# Patient Record
Sex: FEMALE | Race: ASIAN | Employment: UNEMPLOYED | URBAN - METROPOLITAN AREA
[De-identification: names, ages, dates, MRNs, and addresses within clinical notes are randomized per-mention and may not be internally consistent; named-entity substitution may affect disease eponyms.]

---

## 2017-01-20 ENCOUNTER — OFFICE VISIT (OUTPATIENT)
Dept: FAMILY MEDICINE CLINIC | Age: 57
End: 2017-01-20

## 2017-01-20 VITALS
HEIGHT: 63 IN | WEIGHT: 160.4 LBS | HEART RATE: 81 BPM | TEMPERATURE: 97.9 F | BODY MASS INDEX: 28.42 KG/M2 | RESPIRATION RATE: 16 BRPM | DIASTOLIC BLOOD PRESSURE: 72 MMHG | OXYGEN SATURATION: 98 % | SYSTOLIC BLOOD PRESSURE: 129 MMHG

## 2017-01-20 DIAGNOSIS — Z12.11 COLON CANCER SCREENING: ICD-10-CM

## 2017-01-20 DIAGNOSIS — Z12.39 BREAST CANCER SCREENING: ICD-10-CM

## 2017-01-20 DIAGNOSIS — Z86.39 HISTORY OF HYPERTHYROIDISM: ICD-10-CM

## 2017-01-20 DIAGNOSIS — J01.00 ACUTE NON-RECURRENT MAXILLARY SINUSITIS: Primary | ICD-10-CM

## 2017-01-20 DIAGNOSIS — R23.2 HOT FLASHES: ICD-10-CM

## 2017-01-20 PROCEDURE — 99214 OFFICE O/P EST MOD 30 MIN: CPT | Performed by: FAMILY MEDICINE

## 2017-01-20 RX ORDER — AZITHROMYCIN 250 MG/1
250 TABLET, FILM COATED ORAL DAILY
Qty: 6 TABLET | Refills: 0 | Status: SHIPPED | OUTPATIENT
Start: 2017-01-20 | End: 2017-01-25

## 2017-01-20 ASSESSMENT — ENCOUNTER SYMPTOMS
CONSTIPATION: 0
EYE ITCHING: 0
RECTAL PAIN: 0
CHEST TIGHTNESS: 0
SINUS PRESSURE: 1
DIARRHEA: 0
EYE PAIN: 0
WHEEZING: 0
VOMITING: 0
TROUBLE SWALLOWING: 0
NAUSEA: 0
COUGH: 0
PHOTOPHOBIA: 0
SHORTNESS OF BREATH: 0
CHOKING: 0
FACIAL SWELLING: 0
ABDOMINAL PAIN: 0
SORE THROAT: 0
RHINORRHEA: 1
VOICE CHANGE: 0
APNEA: 0
STRIDOR: 0
EYE DISCHARGE: 0
EYE REDNESS: 0
ANAL BLEEDING: 0
ABDOMINAL DISTENTION: 0
BLOOD IN STOOL: 0

## 2017-01-23 DIAGNOSIS — Z13.0 SCREENING FOR DEFICIENCY ANEMIA: Primary | ICD-10-CM

## 2017-03-06 ENCOUNTER — OFFICE VISIT (OUTPATIENT)
Dept: ENDOCRINOLOGY | Age: 57
End: 2017-03-06

## 2017-03-06 VITALS
OXYGEN SATURATION: 99 % | WEIGHT: 159.4 LBS | SYSTOLIC BLOOD PRESSURE: 138 MMHG | HEIGHT: 63 IN | HEART RATE: 105 BPM | DIASTOLIC BLOOD PRESSURE: 84 MMHG | BODY MASS INDEX: 28.24 KG/M2

## 2017-03-06 DIAGNOSIS — I10 ESSENTIAL HYPERTENSION: ICD-10-CM

## 2017-03-06 DIAGNOSIS — E53.8 VITAMIN B12 DEFICIENCY: ICD-10-CM

## 2017-03-06 DIAGNOSIS — N95.1 MENOPAUSAL FLUSHING: Primary | ICD-10-CM

## 2017-03-06 DIAGNOSIS — L65.9 HAIR LOSS: ICD-10-CM

## 2017-03-06 DIAGNOSIS — E55.9 VITAMIN D DEFICIENCY: ICD-10-CM

## 2017-03-06 DIAGNOSIS — Z86.39 HISTORY OF HYPERTHYROIDISM: ICD-10-CM

## 2017-03-06 PROCEDURE — 99215 OFFICE O/P EST HI 40 MIN: CPT | Performed by: INTERNAL MEDICINE

## 2017-03-06 ASSESSMENT — PATIENT HEALTH QUESTIONNAIRE - PHQ9
2. FEELING DOWN, DEPRESSED OR HOPELESS: 0
1. LITTLE INTEREST OR PLEASURE IN DOING THINGS: 0
SUM OF ALL RESPONSES TO PHQ QUESTIONS 1-9: 0
SUM OF ALL RESPONSES TO PHQ9 QUESTIONS 1 & 2: 0

## 2017-04-07 ENCOUNTER — TELEPHONE (OUTPATIENT)
Dept: FAMILY MEDICINE CLINIC | Age: 57
End: 2017-04-07

## 2017-04-07 ENCOUNTER — PATIENT MESSAGE (OUTPATIENT)
Dept: FAMILY MEDICINE CLINIC | Age: 57
End: 2017-04-07

## 2017-04-07 DIAGNOSIS — R42 DIZZINESS: Primary | ICD-10-CM

## 2017-04-10 ENCOUNTER — OFFICE VISIT (OUTPATIENT)
Dept: ENDOCRINOLOGY | Age: 57
End: 2017-04-10

## 2017-04-10 VITALS
DIASTOLIC BLOOD PRESSURE: 82 MMHG | OXYGEN SATURATION: 99 % | SYSTOLIC BLOOD PRESSURE: 130 MMHG | HEIGHT: 63 IN | HEART RATE: 81 BPM | BODY MASS INDEX: 28.31 KG/M2 | WEIGHT: 159.8 LBS

## 2017-04-10 DIAGNOSIS — R42 LIGHTHEADED: ICD-10-CM

## 2017-04-10 DIAGNOSIS — E04.2 MULTINODULAR GOITER: Primary | ICD-10-CM

## 2017-04-10 DIAGNOSIS — N95.1 MENOPAUSAL STATE: ICD-10-CM

## 2017-04-10 DIAGNOSIS — G90.9 DYSFUNCTIONAL AUTONOMIC NERVOUS SYSTEM: ICD-10-CM

## 2017-04-10 PROCEDURE — 99214 OFFICE O/P EST MOD 30 MIN: CPT | Performed by: INTERNAL MEDICINE

## 2017-04-10 RX ORDER — ESTRADIOL 0.07 MG/D
1 FILM, EXTENDED RELEASE TRANSDERMAL
Qty: 4 PATCH | Refills: 3 | Status: SHIPPED | OUTPATIENT
Start: 2017-04-10 | End: 2017-05-30

## 2017-04-10 ASSESSMENT — PATIENT HEALTH QUESTIONNAIRE - PHQ9
SUM OF ALL RESPONSES TO PHQ QUESTIONS 1-9: 0
2. FEELING DOWN, DEPRESSED OR HOPELESS: 0
1. LITTLE INTEREST OR PLEASURE IN DOING THINGS: 0
SUM OF ALL RESPONSES TO PHQ9 QUESTIONS 1 & 2: 0

## 2017-05-02 ENCOUNTER — TELEPHONE (OUTPATIENT)
Dept: ENDOCRINOLOGY | Age: 57
End: 2017-05-02

## 2017-05-16 ENCOUNTER — OFFICE VISIT (OUTPATIENT)
Dept: INTERNAL MEDICINE CLINIC | Age: 57
End: 2017-05-16

## 2017-05-16 VITALS
SYSTOLIC BLOOD PRESSURE: 132 MMHG | WEIGHT: 159.2 LBS | HEART RATE: 80 BPM | BODY MASS INDEX: 28.21 KG/M2 | HEIGHT: 63 IN | DIASTOLIC BLOOD PRESSURE: 86 MMHG

## 2017-05-16 DIAGNOSIS — E04.9 GOITER: ICD-10-CM

## 2017-05-16 DIAGNOSIS — G89.29 CHRONIC NONINTRACTABLE HEADACHE, UNSPECIFIED HEADACHE TYPE: Primary | ICD-10-CM

## 2017-05-16 DIAGNOSIS — R51.9 CHRONIC NONINTRACTABLE HEADACHE, UNSPECIFIED HEADACHE TYPE: Primary | ICD-10-CM

## 2017-05-16 PROCEDURE — 99214 OFFICE O/P EST MOD 30 MIN: CPT | Performed by: INTERNAL MEDICINE

## 2017-05-16 ASSESSMENT — ENCOUNTER SYMPTOMS
NAUSEA: 0
WHEEZING: 0
ABDOMINAL PAIN: 0
TROUBLE SWALLOWING: 0
COUGH: 0
VOMITING: 0
VOICE CHANGE: 0

## 2017-05-22 ENCOUNTER — OFFICE VISIT (OUTPATIENT)
Dept: FAMILY MEDICINE CLINIC | Age: 57
End: 2017-05-22

## 2017-05-22 VITALS
RESPIRATION RATE: 16 BRPM | BODY MASS INDEX: 28.51 KG/M2 | DIASTOLIC BLOOD PRESSURE: 83 MMHG | OXYGEN SATURATION: 98 % | TEMPERATURE: 97.3 F | WEIGHT: 160.9 LBS | SYSTOLIC BLOOD PRESSURE: 122 MMHG | HEIGHT: 63 IN | HEART RATE: 99 BPM

## 2017-05-22 DIAGNOSIS — R42 LIGHTHEADEDNESS: ICD-10-CM

## 2017-05-22 PROCEDURE — 99214 OFFICE O/P EST MOD 30 MIN: CPT | Performed by: FAMILY MEDICINE

## 2017-05-22 ASSESSMENT — ENCOUNTER SYMPTOMS
STRIDOR: 0
VOMITING: 0
BLOOD IN STOOL: 0
CHEST TIGHTNESS: 0
WHEEZING: 0
NAUSEA: 0
ABDOMINAL PAIN: 0
ABDOMINAL DISTENTION: 0
DIARRHEA: 0
SHORTNESS OF BREATH: 0
CHOKING: 0
APNEA: 0
RECTAL PAIN: 0
ANAL BLEEDING: 0
COUGH: 0
CONSTIPATION: 0

## 2017-05-23 ENCOUNTER — TELEPHONE (OUTPATIENT)
Dept: FAMILY MEDICINE CLINIC | Age: 57
End: 2017-05-23

## 2017-05-30 ENCOUNTER — OFFICE VISIT (OUTPATIENT)
Dept: ENDOCRINOLOGY | Age: 57
End: 2017-05-30

## 2017-05-30 VITALS
HEART RATE: 97 BPM | WEIGHT: 161.8 LBS | SYSTOLIC BLOOD PRESSURE: 122 MMHG | BODY MASS INDEX: 28.67 KG/M2 | DIASTOLIC BLOOD PRESSURE: 84 MMHG | OXYGEN SATURATION: 99 % | HEIGHT: 63 IN

## 2017-05-30 DIAGNOSIS — E04.9 GOITER: ICD-10-CM

## 2017-05-30 DIAGNOSIS — Z86.39 HISTORY OF HYPERTHYROIDISM: Primary | ICD-10-CM

## 2017-05-30 DIAGNOSIS — R79.89: ICD-10-CM

## 2017-05-30 DIAGNOSIS — R42 DIZZINESS: ICD-10-CM

## 2017-05-30 PROCEDURE — 99214 OFFICE O/P EST MOD 30 MIN: CPT | Performed by: INTERNAL MEDICINE

## 2017-05-30 RX ORDER — METHIMAZOLE 5 MG/1
2.5 TABLET ORAL EVERY OTHER DAY
Qty: 15 TABLET | Refills: 4 | Status: SHIPPED | OUTPATIENT
Start: 2017-05-30 | End: 2017-09-26

## 2017-05-30 ASSESSMENT — PATIENT HEALTH QUESTIONNAIRE - PHQ9
SUM OF ALL RESPONSES TO PHQ9 QUESTIONS 1 & 2: 0
2. FEELING DOWN, DEPRESSED OR HOPELESS: 0
1. LITTLE INTEREST OR PLEASURE IN DOING THINGS: 0
SUM OF ALL RESPONSES TO PHQ QUESTIONS 1-9: 0

## 2017-05-31 LAB — CREATININE URINE: 107 MG/DL (ref 28–259)

## 2017-06-01 LAB — MISCELLANEOUS LAB TEST ORDER: NORMAL

## 2017-06-13 ENCOUNTER — TELEPHONE (OUTPATIENT)
Dept: FAMILY MEDICINE CLINIC | Age: 57
End: 2017-06-13

## 2017-06-14 ENCOUNTER — OFFICE VISIT (OUTPATIENT)
Dept: FAMILY MEDICINE CLINIC | Age: 57
End: 2017-06-14

## 2017-06-14 VITALS
SYSTOLIC BLOOD PRESSURE: 128 MMHG | TEMPERATURE: 97.6 F | WEIGHT: 160.47 LBS | OXYGEN SATURATION: 98 % | BODY MASS INDEX: 28.43 KG/M2 | DIASTOLIC BLOOD PRESSURE: 74 MMHG | HEIGHT: 63 IN | HEART RATE: 83 BPM | RESPIRATION RATE: 16 BRPM

## 2017-06-14 DIAGNOSIS — T14.8XXA ABRASION: Primary | ICD-10-CM

## 2017-06-14 PROCEDURE — 99214 OFFICE O/P EST MOD 30 MIN: CPT | Performed by: FAMILY MEDICINE

## 2017-06-14 ASSESSMENT — ENCOUNTER SYMPTOMS
SINUS PRESSURE: 0
CHEST TIGHTNESS: 0
ABDOMINAL DISTENTION: 0
WHEEZING: 0
APNEA: 0
SORE THROAT: 0
VOICE CHANGE: 0
SHORTNESS OF BREATH: 0
VOMITING: 0
DIARRHEA: 0
FACIAL SWELLING: 0
STRIDOR: 0
CHOKING: 0
TROUBLE SWALLOWING: 0
ABDOMINAL PAIN: 0
NAUSEA: 0
CONSTIPATION: 0
RHINORRHEA: 0
COUGH: 0

## 2017-06-30 ENCOUNTER — OFFICE VISIT (OUTPATIENT)
Dept: FAMILY MEDICINE CLINIC | Age: 57
End: 2017-06-30

## 2017-06-30 VITALS
HEIGHT: 63 IN | WEIGHT: 160.6 LBS | RESPIRATION RATE: 16 BRPM | BODY MASS INDEX: 28.46 KG/M2 | DIASTOLIC BLOOD PRESSURE: 82 MMHG | SYSTOLIC BLOOD PRESSURE: 126 MMHG | HEART RATE: 85 BPM

## 2017-06-30 DIAGNOSIS — K21.9 GASTROESOPHAGEAL REFLUX DISEASE WITHOUT ESOPHAGITIS: ICD-10-CM

## 2017-06-30 DIAGNOSIS — Z86.39 HISTORY OF HYPERTHYROIDISM: ICD-10-CM

## 2017-06-30 DIAGNOSIS — T14.8XXA ABRASION: Primary | ICD-10-CM

## 2017-06-30 PROCEDURE — 99213 OFFICE O/P EST LOW 20 MIN: CPT | Performed by: FAMILY MEDICINE

## 2017-06-30 ASSESSMENT — ENCOUNTER SYMPTOMS
CHEST TIGHTNESS: 0
DIARRHEA: 0
SINUS PRESSURE: 0
VOICE CHANGE: 0
SHORTNESS OF BREATH: 0
ABDOMINAL DISTENTION: 0
RHINORRHEA: 0
APNEA: 0
ABDOMINAL PAIN: 0
NAUSEA: 0
WHEEZING: 0
STRIDOR: 0
VOMITING: 0
FACIAL SWELLING: 0
SORE THROAT: 0
CONSTIPATION: 0
TROUBLE SWALLOWING: 0
COUGH: 0
CHOKING: 0

## 2017-07-24 ENCOUNTER — OFFICE VISIT (OUTPATIENT)
Dept: FAMILY MEDICINE CLINIC | Age: 57
End: 2017-07-24

## 2017-07-24 VITALS
SYSTOLIC BLOOD PRESSURE: 126 MMHG | RESPIRATION RATE: 16 BRPM | OXYGEN SATURATION: 98 % | BODY MASS INDEX: 28.81 KG/M2 | HEIGHT: 63 IN | TEMPERATURE: 97.9 F | HEART RATE: 81 BPM | WEIGHT: 162.6 LBS | DIASTOLIC BLOOD PRESSURE: 84 MMHG

## 2017-07-24 DIAGNOSIS — L50.9 HIVES: Primary | ICD-10-CM

## 2017-07-24 PROCEDURE — 99214 OFFICE O/P EST MOD 30 MIN: CPT | Performed by: FAMILY MEDICINE

## 2017-07-24 RX ORDER — EPINEPHRINE 0.3 MG/.3ML
0.3 INJECTION SUBCUTANEOUS ONCE
Qty: 0.3 ML | Refills: 0 | Status: SHIPPED | OUTPATIENT
Start: 2017-07-24 | End: 2017-09-26

## 2017-07-24 ASSESSMENT — ENCOUNTER SYMPTOMS
STRIDOR: 0
ABDOMINAL PAIN: 0
SHORTNESS OF BREATH: 0
CONSTIPATION: 0
WHEEZING: 0
COUGH: 0
TROUBLE SWALLOWING: 0
ABDOMINAL DISTENTION: 0
CHOKING: 0
CHEST TIGHTNESS: 0
ANAL BLEEDING: 0
VOICE CHANGE: 0
VOMITING: 0
BLOOD IN STOOL: 0
NAUSEA: 0
APNEA: 0
RECTAL PAIN: 0
DIARRHEA: 0

## 2017-08-14 ENCOUNTER — TELEPHONE (OUTPATIENT)
Dept: FAMILY MEDICINE CLINIC | Age: 57
End: 2017-08-14

## 2017-08-16 ENCOUNTER — OFFICE VISIT (OUTPATIENT)
Dept: FAMILY MEDICINE CLINIC | Age: 57
End: 2017-08-16

## 2017-08-16 VITALS
BODY MASS INDEX: 28.65 KG/M2 | SYSTOLIC BLOOD PRESSURE: 131 MMHG | WEIGHT: 161.7 LBS | OXYGEN SATURATION: 99 % | TEMPERATURE: 97.8 F | RESPIRATION RATE: 16 BRPM | HEIGHT: 63 IN | HEART RATE: 72 BPM | DIASTOLIC BLOOD PRESSURE: 82 MMHG

## 2017-08-16 DIAGNOSIS — G44.209 ACUTE NON INTRACTABLE TENSION-TYPE HEADACHE: Primary | ICD-10-CM

## 2017-08-16 PROCEDURE — 99214 OFFICE O/P EST MOD 30 MIN: CPT | Performed by: FAMILY MEDICINE

## 2017-08-16 RX ORDER — BUTALBITAL, ACETAMINOPHEN AND CAFFEINE 50; 325; 40 MG/1; MG/1; MG/1
1 TABLET ORAL EVERY 6 HOURS PRN
Qty: 120 TABLET | Refills: 0 | Status: SHIPPED | OUTPATIENT
Start: 2017-08-16 | End: 2017-09-26

## 2017-08-16 ASSESSMENT — ENCOUNTER SYMPTOMS
STRIDOR: 0
CHEST TIGHTNESS: 0
ANAL BLEEDING: 0
SHORTNESS OF BREATH: 0
CONSTIPATION: 0
BLOOD IN STOOL: 0
ABDOMINAL PAIN: 0
ABDOMINAL DISTENTION: 0
NAUSEA: 0
COUGH: 0
CHOKING: 0
APNEA: 0
VOMITING: 0
RECTAL PAIN: 0
DIARRHEA: 0
WHEEZING: 0

## 2017-10-05 ENCOUNTER — OFFICE VISIT (OUTPATIENT)
Dept: FAMILY MEDICINE CLINIC | Age: 57
End: 2017-10-05

## 2017-10-05 VITALS
SYSTOLIC BLOOD PRESSURE: 112 MMHG | HEART RATE: 80 BPM | DIASTOLIC BLOOD PRESSURE: 78 MMHG | TEMPERATURE: 98.5 F | HEIGHT: 63 IN | OXYGEN SATURATION: 98 % | WEIGHT: 161.5 LBS | BODY MASS INDEX: 28.62 KG/M2 | RESPIRATION RATE: 16 BRPM

## 2017-10-05 DIAGNOSIS — L23.2 ALLERGIC CONTACT DERMATITIS DUE TO COSMETICS: Primary | ICD-10-CM

## 2017-10-05 DIAGNOSIS — Z23 NEED FOR INFLUENZA VACCINATION: ICD-10-CM

## 2017-10-05 PROCEDURE — 99214 OFFICE O/P EST MOD 30 MIN: CPT | Performed by: FAMILY MEDICINE

## 2017-10-05 RX ORDER — DIPHENHYDRAMINE HCL 25 MG
25 CAPSULE ORAL EVERY 4 HOURS PRN
Qty: 40 CAPSULE | Refills: 1 | Status: SHIPPED | OUTPATIENT
Start: 2017-10-05 | End: 2017-10-10

## 2017-10-05 RX ORDER — PREDNISONE 10 MG/1
10 TABLET ORAL DAILY
Qty: 1 TABLET | Refills: 0 | Status: SHIPPED | OUTPATIENT
Start: 2017-10-05 | End: 2017-11-27

## 2017-10-05 ASSESSMENT — ENCOUNTER SYMPTOMS
EYE DISCHARGE: 0
APNEA: 0
RHINORRHEA: 0
EYE REDNESS: 0
SORE THROAT: 0
EYE PAIN: 0
SINUS PRESSURE: 0
BLOOD IN STOOL: 0
WHEEZING: 0
CHEST TIGHTNESS: 0
RECTAL PAIN: 0
TROUBLE SWALLOWING: 0
FACIAL SWELLING: 0
VOICE CHANGE: 0
ABDOMINAL DISTENTION: 0
VOMITING: 0
NAUSEA: 0
SHORTNESS OF BREATH: 0
COUGH: 0
ANAL BLEEDING: 0
STRIDOR: 0
CONSTIPATION: 0
DIARRHEA: 0
PHOTOPHOBIA: 0
EYE ITCHING: 0
CHOKING: 0
ABDOMINAL PAIN: 0

## 2017-10-05 NOTE — MR AVS SNAPSHOT
After Visit Summary             Salma Mcgovern   10/5/2017 12:15 PM   Office Visit    Description:  Female : 1960   Provider:  Zachary Mott MD   Department:  [address] [place] [address]              Your Follow-Up and Future Appointments         Below is a list of your follow-up and future appointments. This may not be a complete list as you may have made appointments directly with providers that we are not aware of or your providers may have made some for you. Please call your providers to confirm appointments. It is important to keep your appointments. Please bring your current insurance card, photo ID, co-pay, and all medication bottles to your appointment. If self-pay, payment is expected at the time of service. Your To-Do List     Future Appointments Provider Department Dept Phone    2017 3:00 PM Savannah Nguyễn, [address] [place] [address] Neurology [phone]    Please arrive 15 minutes prior to appointment time, bring insurance card and photo ID. Follow-Up    Return in about 1 week (around 10/12/2017) for rash f/u. Information from Your Visit        Department     Name Address Phone Fax    Henri[address] Bearden [address], Yvonne Bethea [facility] [address]  [address] [phone]      You Were Seen for:         Comments    Allergic contact dermatitis due to cosmetics   [[id number]]         Vital Signs     Blood Pressure Pulse Temperature Respirations Height Weight    112/78 (Site: Left Arm, Position: Sitting, Cuff Size: Small Adult) 92 98.5 °F (36.9 °C) (Oral) 16 5' 3\" (1.6 m) 161 lb 8 oz (73.3 kg)    Oxygen Saturation Body Mass Index Smoking Status             98% 28.61 kg/m2 Never Smoker         Additional Information about your Body Mass Index (BMI)           Your BMI as listed above is considered overweight (25.0-29.9). BMI is an estimate of body fat, calculated from your height and weight.   The higher · Take an over-the-counter antihistamine, such as diphenhydramine (Benadryl) or loratadine (Claritin), to help calm the itching. Read and follow all instructions on the label. · If your doctor prescribed a cream, use it as directed. If your doctor prescribed medicine, take it exactly as directed. When should you call for help? Call your doctor now or seek immediate medical care if:  · You have symptoms of infection, such as:  ¨ Increased pain, swelling, warmth, or redness. ¨ Red streaks leading from the area. ¨ Pus draining from the area. ¨ A fever. · You have joint pain along with the rash. Watch closely for changes in your health, and be sure to contact your doctor if:  · Your rash is changing or getting worse. · You are not getting better as expected. Where can you learn more? Go to [email or web address] org and sign in to your [employer] account. Enter [phone] in the ZikBit box to learn more about \"Dermatitis: Care Instructions. \"     If you do not have an account, please click on the \"Sign Up Now\" link. Current as of: October 13, 2016  Content Version: 11.3  © 2896-5446 [employer]. Care instructions adapted under license by [facility] ([facility]). If you have questions about a medical condition or this instruction, always ask your healthcare professional. [place] [address] any warranty or liability for your use of this information. Today's Medication Changes          These changes are accurate as of: 10/5/17 12:52 PM.  If you have any questions, ask your nurse or doctor. START taking these medications           diphenhydrAMINE 25 MG capsule   Commonly known as:  BENADRYL ALLERGY   Instructions: Take 1 capsule by mouth every 4 hours as needed for Itching or Allergies PO q2-4hrs prn. Max daily=400mg. Max per dose=100mg.    Quantity:  40 capsule   Refills:  1       predniSONE 10 MG tablet   Commonly known as:  Valerie Crenshaw Instructions: Take 1 tablet by mouth daily Following instructions: TAPER:50mg x3days, then 40mg x3days,then 30mg x 2days, then 20mg x2 days, 10mg x2days, then 5mg x2days & then stop. DISPENSE:quantity sufficient as per taper   Quantity:  1 tablet   Refills:  0            Where to Get Your Medications      You can get these medications from any pharmacy     Bring a paper prescription for each of these medications     diphenhydrAMINE 25 MG capsule    predniSONE 10 MG tablet               Your Current Medications Are              predniSONE (DELTASONE) 10 MG tablet Take 1 tablet by mouth daily Following instructions:  TAPER:50mg x3days, then 40mg x3days,then 30mg x 2days, then 20mg x2 days, 10mg x2days, then 5mg x2days & then stop. DISPENSE:quantity sufficient as per taper    diphenhydrAMINE (BENADRYL ALLERGY) 25 MG capsule Take 1 capsule by mouth every 4 hours as needed for Itching or Allergies PO q2-4hrs prn. Max daily=400mg. Max per dose=100mg.       Allergies              Aleve [Naproxen Sodium]     Delsym Cough Relief [Dextromethorphan Hbr]     dizziness    Haldol [Haloperidol Lactate]     Morphine And Related     Penicillins     Prochlorperazine Edisylate     Reglan [Metoclopramide Hcl]     Sulfa Antibiotics          Additional Information        Basic Information     Date Of Birth Sex Race Ethnicity Preferred Language    1960 Female  Non-/Non  English      Problem List as of 10/5/2017  Date Reviewed: 9/26/2017                History of hyperthyroidism    Abnormal MRI, cervical spine 5/28/14    Other and unspecified hyperlipidemia    Prediabetes    Scalp burning sensation    Mercury level elevated    Depression    West Nile +IgG per neurologist lab     Numbness    Complicated migraine    Anxiety    Insomnia    Stress    Elevated CSF protein    Nausea & vomiting    Syncope    Meniere's vertigo    Positive JIMMY (antinuclear antibody)    Excessive sweating, local    Facial weakness Right facial numbness    Headache    Hearing loss    Tinnitus    Meniere's disease    Hearing loss in right ear    Dizziness    Tendinosis:Left rotator cuff    subacromial/subdeltoid bursitis:left shoulder    Left shoulder pain    Rotator cuff injury:left    Allergic rhinitis    Acne vulgaris    Impaired fasting glucose    Family history of diabetes mellitus    Elevated alkaline phosphatase level    Hair loss    Degenerative arthritis of thoracic spine    Meniere disease    Sweating    Spells    Weight loss    Fibroid, uterine    Pure hypercholesterolemia    GERD (gastroesophageal reflux disease)    Multinodular goiter    Vitamin D deficiency    Symptomatic menopausal or female climacteric states      Immunizations as of 10/5/2017     Name Date    Influenza Virus Vaccine 10/1/2014      Preventive Care        Date Due    Hepatitis C screening is recommended for all adults regardless of risk factors born between [facility] at least once (lifetime) who have never been tested. 1960    HIV screening is recommended for all people regardless of risk factors  aged 15-65 years at least once (lifetime) who have never been HIV tested. 3/15/1975    Tetanus Combination Vaccine (1 - Tdap) 3/15/1979    Colonoscopy 3/15/2010    Pap Smear 2/4/2017    Mammograms are recommended every 2 years for low/average risk patients aged 48 - 69, and every year for high risk patients per updated national guidelines. However these guidelines can be individualized by your provider. 4/4/2017    Yearly Flu Vaccine (1) 9/1/2017    Cholesterol Screening 1/19/2022            [employer] Signup           Our records indicate that you have an active [employer] account. You can view your After Visit Summary by going to [email or web address] and logging in with your [employer] username and password.       If you don't have a [employer] username and password but a parent or guardian has access to your record, the parent or guardian should login with their own Inogen username and password and access your record to view the After Visit Summary. Additional Information  If you have questions, please contact the physician practice where you receive care. Remember, [employer]ogen is NOT to be used for urgent needs. For medical emergencies, dial 911. For questions regarding your Inogen account call [phone]. If you have a clinical question, please call your doctor's office.

## 2017-10-05 NOTE — PATIENT INSTRUCTIONS
Dermatitis: Care Instructions  Your Care Instructions  Dermatitis is the general name used for any rash or inflammation of the skin. Different kinds of dermatitis cause different kinds of rashes. Common causes of a rash include new medicines, plants (such as poison oak or poison ivy), heat, and stress. Certain illnesses can also cause a rash. An allergic reaction to something that touches your skin, such as latex, nickel, or poison ivy, is called contact dermatitis. Contact dermatitis may also be caused by something that irritates the skin, such as bleach, a chemical, or soap. These types of rashes cannot be spread from person to person. How long your rash will last depends on what caused it. Rashes may last a few days or months. Follow-up care is a key part of your treatment and safety. Be sure to make and go to all appointments, and call your doctor if you are having problems. It's also a good idea to know your test results and keep a list of the medicines you take. How can you care for yourself at home? · Do not scratch the rash. Cut your nails short, and file them smooth. Or wear gloves if this helps keep you from scratching. · Wash the area with water only. Pat dry. · Put cold, wet cloths on the rash to reduce itching. · Keep cool, and stay out of the sun. · Leave the rash open to the air as much as possible. · If the rash itches, use hydrocortisone cream. Follow the directions on the label. Calamine lotion may help for plant rashes. · Take an over-the-counter antihistamine, such as diphenhydramine (Benadryl) or loratadine (Claritin), to help calm the itching. Read and follow all instructions on the label. · If your doctor prescribed a cream, use it as directed. If your doctor prescribed medicine, take it exactly as directed. When should you call for help?   Call your doctor now or seek immediate medical care if:  · You have symptoms of infection, such as:  ¨ Increased pain, swelling, warmth, or redness. ¨ Red streaks leading from the area. ¨ Pus draining from the area. ¨ A fever. · You have joint pain along with the rash. Watch closely for changes in your health, and be sure to contact your doctor if:  · Your rash is changing or getting worse. · You are not getting better as expected. Where can you learn more? Go to [email or web address]://DayforcepeRadiojareb.[email or web address] [email or web address] org and sign in to your Dayforce account. Enter [phone] in the [facility] box to learn more about \"Dermatitis: Care Instructions. \"     If you do not have an account, please click on the \"Sign Up Now\" link. Current as of: October 13, 2016  Content Version: 11.3  © 3445-1288 [employer]. Care instructions adapted under license by [facility] ([facility]). If you have questions about a medical condition or this instruction, always ask your healthcare professional. [place] [address] any warranty or liability for your use of this information.

## 2017-10-05 NOTE — PROGRESS NOTES
distress. HENT:   Head:       Nose: Nose normal.   Mouth/Throat: Uvula is midline, oropharynx is clear and moist and mucous membranes are normal. No oropharyngeal exudate. See face exam for locations of dermatitis:no herpes rash. No TTP. Hearing intact to nml conversation   Eyes: Conjunctivae, EOM and lids are normal. Pupils are equal, round, and reactive to light. Neck: Trachea normal and normal range of motion. Neck supple. Cardiovascular: Normal rate, regular rhythm, normal heart sounds, intact distal pulses and normal pulses. Pulmonary/Chest: Effort normal and breath sounds normal.   CTAB,good AE bilaterally   Abdominal: Soft. Normal appearance and bowel sounds are normal. She exhibits no distension and no mass. There is no splenomegaly or hepatomegaly. There is no tenderness. There is no rebound and no guarding. Lymphadenopathy:        Head (right side): No submental, no submandibular, no tonsillar, no preauricular, no posterior auricular and no occipital adenopathy present. Head (left side): No submental, no submandibular, no tonsillar, no preauricular, no posterior auricular and no occipital adenopathy present. She has no cervical adenopathy. Right cervical: No superficial cervical, no deep cervical and no posterior cervical adenopathy present. Left cervical: No superficial cervical, no deep cervical and no posterior cervical adenopathy present. No supraclavicular LAD. Neurological: She is alert and oriented to person, place, and time. Skin: Skin is warm, dry and intact. Rash noted. No abrasion and no bruising noted. Rash is not pustular. She is not diaphoretic. No pallor. See HENT exam:no exam findings concerning for herpes/cellulitis. Good skin turgor. Capillary refill=2-3 secs. Psychiatric: She has a normal mood and affect. Assessment:        1. Allergic contact dermatitis due to cosmetics:facial  VSS/well appearing. Likely due to dial soap.   Advised to stop dial soap. Possible med side effects reviewed. Pt' wishes to proceed w/medication. Pt' states she will try benadryl first & if not helping will consider starting prednisone. F/u with her dermatologist/allergist also advised. predniSONE (DELTASONE) 10 MG tablet    diphenhydrAMINE (BENADRYL ALLERGY) 25 MG capsule   2. Need for influenza vaccination  Declined by pt'. Plan:      Pt' left office in good condition. Call or return to clinic prn if these symptoms worsen or fail to improve as anticipated. Advised to go to local ER or call 911 for any worrisome signs/sx including but not limited to worsening of current complaint or development of resp distress/dysphagia/anaphylaxis.

## 2017-10-08 ENCOUNTER — PATIENT MESSAGE (OUTPATIENT)
Dept: FAMILY MEDICINE CLINIC | Age: 57
End: 2017-10-08

## 2017-11-27 ENCOUNTER — OFFICE VISIT (OUTPATIENT)
Dept: FAMILY MEDICINE CLINIC | Age: 57
End: 2017-11-27

## 2017-11-27 VITALS
SYSTOLIC BLOOD PRESSURE: 129 MMHG | HEIGHT: 63 IN | OXYGEN SATURATION: 98 % | BODY MASS INDEX: 28.4 KG/M2 | HEART RATE: 82 BPM | DIASTOLIC BLOOD PRESSURE: 86 MMHG | TEMPERATURE: 97.5 F | WEIGHT: 160.3 LBS | RESPIRATION RATE: 16 BRPM

## 2017-11-27 DIAGNOSIS — L23.2 ALLERGIC CONTACT DERMATITIS DUE TO COSMETICS: Primary | ICD-10-CM

## 2017-11-27 PROCEDURE — 3014F SCREEN MAMMO DOC REV: CPT | Performed by: FAMILY MEDICINE

## 2017-11-27 PROCEDURE — 1036F TOBACCO NON-USER: CPT | Performed by: FAMILY MEDICINE

## 2017-11-27 PROCEDURE — G8417 CALC BMI ABV UP PARAM F/U: HCPCS | Performed by: FAMILY MEDICINE

## 2017-11-27 PROCEDURE — G8427 DOCREV CUR MEDS BY ELIG CLIN: HCPCS | Performed by: FAMILY MEDICINE

## 2017-11-27 PROCEDURE — 3017F COLORECTAL CA SCREEN DOC REV: CPT | Performed by: FAMILY MEDICINE

## 2017-11-27 PROCEDURE — G8484 FLU IMMUNIZE NO ADMIN: HCPCS | Performed by: FAMILY MEDICINE

## 2017-11-27 PROCEDURE — 99214 OFFICE O/P EST MOD 30 MIN: CPT | Performed by: FAMILY MEDICINE

## 2017-11-27 ASSESSMENT — ENCOUNTER SYMPTOMS
STRIDOR: 0
WHEEZING: 0
NAUSEA: 0
EYE DISCHARGE: 0
CHOKING: 0
VOICE CHANGE: 0
FACIAL SWELLING: 0
EYE ITCHING: 0
RECTAL PAIN: 0
CHEST TIGHTNESS: 0
EYE REDNESS: 0
COUGH: 0
ABDOMINAL DISTENTION: 0
DIARRHEA: 0
VOMITING: 0
PHOTOPHOBIA: 0
BLOOD IN STOOL: 0
SINUS PAIN: 0
SINUS PRESSURE: 0
EYE PAIN: 0
APNEA: 0
CONSTIPATION: 0
TROUBLE SWALLOWING: 0
ABDOMINAL PAIN: 0
SORE THROAT: 0
RHINORRHEA: 0
ANAL BLEEDING: 0
SHORTNESS OF BREATH: 0

## 2017-11-27 NOTE — PROGRESS NOTES
Subjective:      Patient ID: Daivd Josse is a 62 y.o. female. Rash   Pertinent negatives include no congestion, cough, diarrhea, eye pain, fatigue, fever, rhinorrhea, shortness of breath, sore throat or vomiting. New problem:  Presenting with mild rash on forehead x 2weeks. No new cosmetic products  Prior facial cheek rash improved with topical steroids from derm. Associated with intermittent mild itching. Dermatologist is Dr. Ean Guerra last week & prescribed topical steroids that are helping but has not resolved. Worsened(aggravated) by using dove soap. Improving itself gradually. Sick contacts:none recalled. Self tx:none tried. No otc meds tried including no benadryl. Denies pus-exudate/fevers/chills/appetite changes/dizziness/n-v/no other lesions/new cosmetic or detergents/sob/resp distress/neck swelling/other rash locations/blisters.        Allergies   Allergen Reactions    Aleve [Naproxen Sodium]     Delsym Cough Relief [Dextromethorphan Hbr]      dizziness    Haldol [Haloperidol Lactate]     Morphine And Related     Penicillins     Prochlorperazine Edisylate     Reglan [Metoclopramide Hcl]     Sulfa Antibiotics        No current outpatient prescriptions on file prior to visit. No current facility-administered medications on file prior to visit. Past Medical History:   Diagnosis Date    Acne vulgaris     Anxiety 7/25/2014    Degenerative arthritis of thoracic spine 6/2010    Depression 1/8/2015    Dizziness     Under care of neurologist(Dr. Eliana Willoughby)    Fibromyalgia     GERD (gastroesophageal reflux disease) 2008    Goiter     Headache(784.0)     Under care of neurologist(Dr. Eliana Willoughby)    HTN (hypertension)     clonidine per endo:Dr. Quinn Vincent Hypercholesterolemia     under care of endo    Hyperthyroidism     Under care endo(Dr. Nisha Sorto)    Meniere's disease 1995    Under care of ENT(initially Dr Ruby Degree.  Current ENT=Dr.Umesh Schaffer:tel [phone]. see h&p 8/20/2009:updated 8/20/2009    Mitral regurgitation     As per echo 15yrs ago: records pending:updated 8/20/2009. Nuclear stress test 5/20/10=nml as per Dr. Luis Alfredo Armstrong)    Pap smear     2008:[facility] Dr. Malinda Tadeo.  Screening colonoscopy     Declining testing:will notify Dr. Amado Vieira when desired:updated 5/24/11;update 10/8/13    Screening mammogram     Declining testing:will notify Dr. Amado Vieira when desired:updated 5/24/11;10/8/13    subacromial/subdeltoid bursitis:left shoulder 11/12/2013    Syncope and collapse     Tendinosis:Left rotator cuff 11/12/2013    Uterine leiomyoma - fibroids     Required 2 units blood transfusion secondary to Hgb. 7.0. No surgery b/c pat. declined in 2003. Asx & no blood loss since 2003.  Vitamin D deficiency     As per endo           Social History   Substance Use Topics    Smoking status: Never Smoker    Smokeless tobacco: Never Used    Alcohol use No     History   Drug Use No           Review of Systems   Constitutional: Negative for activity change, appetite change, chills, diaphoresis, fatigue, fever and unexpected weight change. HENT: Negative for congestion, dental problem, drooling, ear discharge, ear pain, facial swelling, hearing loss, mouth sores, nosebleeds, postnasal drip, rhinorrhea, sinus pain, sinus pressure, sneezing, sore throat, tinnitus, trouble swallowing and voice change. Eyes: Negative for photophobia, pain, discharge, redness, itching and visual disturbance. Respiratory: Negative for apnea, cough, choking, chest tightness, shortness of breath, wheezing and stridor. Cardiovascular: Negative for chest pain, palpitations and leg swelling. Gastrointestinal: Negative for abdominal distention, abdominal pain, anal bleeding, blood in stool, constipation, diarrhea, nausea, rectal pain and vomiting. Skin: Positive for rash. Negative for pallor and wound. Neurological: Negative for light-headedness.    Hematological: Negative for adenopathy. Objective:   Physical Exam   Constitutional: She is oriented to person, place, and time. Vital signs are normal. She appears well-developed and well-nourished. She is cooperative. She does not have a sickly appearance. No distress. HENT:   Head:       Nose: Nose normal.   Mouth/Throat: Uvula is midline, oropharynx is clear and moist and mucous membranes are normal. No oropharyngeal exudate. See forehead exam for locations of dermatitis:no herpes rash. No TTP. No cellulitis. Hearing intact to nml conversation   Eyes: Conjunctivae, EOM and lids are normal. Pupils are equal, round, and reactive to light. Neck: Trachea normal and normal range of motion. Neck supple. Cardiovascular: Normal rate, regular rhythm, normal heart sounds, intact distal pulses and normal pulses. Pulmonary/Chest: Effort normal and breath sounds normal.   CTAB,good AE bilaterally   Abdominal: Soft. Normal appearance and bowel sounds are normal. She exhibits no distension and no mass. There is no splenomegaly or hepatomegaly. There is no tenderness. There is no rebound and no guarding. Lymphadenopathy:        Head (right side): No submental, no submandibular, no tonsillar, no preauricular, no posterior auricular and no occipital adenopathy present. Head (left side): No submental, no submandibular, no tonsillar, no preauricular, no posterior auricular and no occipital adenopathy present. She has no cervical adenopathy. Right cervical: No superficial cervical, no deep cervical and no posterior cervical adenopathy present. Left cervical: No superficial cervical, no deep cervical and no posterior cervical adenopathy present. No supraclavicular LAD. Neurological: She is alert and oriented to person, place, and time. Skin: Skin is warm, dry and intact. Rash noted. No abrasion and no bruising noted. Rash is not pustular. She is not diaphoretic. No pallor.    See HENT exam regarding rash location:no exam findings concerning for herpes/cellulitis. Good skin turgor. Capillary refill=2-3 secs. Psychiatric: She has a normal mood and affect. Assessment:        1. Allergic contact dermatitis due to cosmetics  VSS/well appearing. Etiology no clear:?dove soap related  Advised to f/u with her dermatologist since not resolved. Avoid all hair products. Avoid using dove soap. Po prednisone declined by pt'. Plan:         Call or return to clinic prn if these symptoms worsen or fail to improve as anticipated. Advised to go to local ER or call 911 for any worrisome signs/sx including but not limited to worsening of current complaint or development of resp distress/dysphagia/anaphylaxis. Pt' left office in good condition.

## 2018-01-16 ENCOUNTER — TELEPHONE (OUTPATIENT)
Dept: FAMILY MEDICINE CLINIC | Age: 58
End: 2018-01-16

## 2018-02-20 ENCOUNTER — OFFICE VISIT (OUTPATIENT)
Dept: FAMILY MEDICINE CLINIC | Age: 58
End: 2018-02-20

## 2018-02-20 ENCOUNTER — TELEPHONE (OUTPATIENT)
Dept: FAMILY MEDICINE CLINIC | Age: 58
End: 2018-02-20

## 2018-02-20 VITALS
TEMPERATURE: 98.6 F | WEIGHT: 157.3 LBS | HEIGHT: 63 IN | RESPIRATION RATE: 16 BRPM | HEART RATE: 102 BPM | BODY MASS INDEX: 27.87 KG/M2 | SYSTOLIC BLOOD PRESSURE: 131 MMHG | DIASTOLIC BLOOD PRESSURE: 80 MMHG | OXYGEN SATURATION: 99 %

## 2018-02-20 DIAGNOSIS — T14.8XXA ABRASION: ICD-10-CM

## 2018-02-20 PROCEDURE — G8427 DOCREV CUR MEDS BY ELIG CLIN: HCPCS | Performed by: FAMILY MEDICINE

## 2018-02-20 PROCEDURE — 3014F SCREEN MAMMO DOC REV: CPT | Performed by: FAMILY MEDICINE

## 2018-02-20 PROCEDURE — G8417 CALC BMI ABV UP PARAM F/U: HCPCS | Performed by: FAMILY MEDICINE

## 2018-02-20 PROCEDURE — 1036F TOBACCO NON-USER: CPT | Performed by: FAMILY MEDICINE

## 2018-02-20 PROCEDURE — 99214 OFFICE O/P EST MOD 30 MIN: CPT | Performed by: FAMILY MEDICINE

## 2018-02-20 PROCEDURE — 3017F COLORECTAL CA SCREEN DOC REV: CPT | Performed by: FAMILY MEDICINE

## 2018-02-20 PROCEDURE — G8484 FLU IMMUNIZE NO ADMIN: HCPCS | Performed by: FAMILY MEDICINE

## 2018-02-20 RX ORDER — METHIMAZOLE 5 MG/1
5 TABLET ORAL
COMMUNITY
End: 2018-04-17

## 2018-02-20 RX ORDER — ALCLOMETASONE DIPROPIONATE 0.5 MG/G
CREAM TOPICAL
COMMUNITY
Start: 2017-10-02 | End: 2018-04-17

## 2018-02-20 ASSESSMENT — ENCOUNTER SYMPTOMS
CONSTIPATION: 0
NAUSEA: 0
ABDOMINAL PAIN: 0
APNEA: 0
DIARRHEA: 0
SINUS PRESSURE: 0
SHORTNESS OF BREATH: 0
SORE THROAT: 0
VOICE CHANGE: 0
ABDOMINAL DISTENTION: 0
COUGH: 0
WHEEZING: 0
TROUBLE SWALLOWING: 0
RHINORRHEA: 0
CHOKING: 0
SINUS PAIN: 0
CHEST TIGHTNESS: 0
STRIDOR: 0
FACIAL SWELLING: 0
VOMITING: 0

## 2018-02-20 NOTE — TELEPHONE ENCOUNTER
From: Case Valladares  To: Ceci Lyon MD  Sent: 10/8/2017 2:46 PM EDT  Subject: Non-Urgent Winton Laine Dr. Aliene Hamman,    We forgot to ask on moms last appt. , are you able to order thyroid labs TSH, Thyroxine Free T4, and Free T3 to make sure her thyroid is functioning ok. We would greatly appreciate it. Yoanna Alva  ----- Message -----  From: Ceci Lyon MD  Sent: 7/18/2017 11:34 AM EDT  To: Case Valladares  Subject: RE: Non-Urgent Medical Question  Thank you for the update.      ----- Message -----   From: Case Valladares   Sent: 7/18/2017 11:29 AM EDT   To: Ceci Lyon MD  Subject: RE: Non-Urgent Winton Laine Dr. Aliene Hamman,    Yes it's first time mom had an allergic reaction she scheduled an appt with dr Jailyn Jacobo. Mom Concerned if allergic reaction had anything to do with scracth or if a virus or infection was passed on by doctor scratch. Or allergic reaction will discuss with dermatologist   ----- Message -----  From: Ceci Lyon MD  Sent: 7/17/2017 8:41 PM EDT  To: Case Valladares  Subject: RE: Non-Urgent Medical Question    The reason for the described reaction is not known. If the rash returns then please schedule an office appointment for evaluation or see her dermatologist.  It is ok to stop using the mupirocin ointment. since she is doing better. ----- Message -----   From: Case Valladares   Sent: 7/17/2017 6:03 PM EDT   To: Ceci Lyon MD  Subject: RE: Non-Urgent Shannon Green experienced an allergic reaction ; rash all over with itching and on the neck also where nail scratch is and minor swelling.  She saw Dr. Opal Steele dermatology who said to us ok to use your ointment mupirocin last Friday is when she experienced the reaction could overuse if it cause this or infection passed on by that doctor scratch that's in now her body the infection I think is gone but scar is still there mom us scared from the allergic reaction I gave her Benadryl and it went away syndrome\". I think her main problem or underlying issue could be this doctor but not sure you will have to evaluate her. Thank Shannan Hilliard  ----- Message -----  From: Andrew Reese MD  Sent: 6/16/2016 4:13 PM EDT  To: Damon Notice  Subject: RE: Non-Urgent Medical Question    Ignacio,  Her blood pressure is too high. Please go to a local urgent care center to have this checked again today. Follow up with me if they confirm high blood pressure(hypertension). Dr. Fabricio Ward.      ----- Message -----   From: Genet Mayrow: 6/16/2016 3:51 PM EDT   To: Andrew Reese MD  Subject: RE: Non-Urgent Medical Question    Thank You very much, now I actually have a question just checked moms blood pressure came with 140/90 with a manual bp, then hour later checked with a automatic one 145/104 with 93 HR. Is this to be concerned she is experiencing since morning sxs of light headed. 2:41pm 145/104 93 HR  3:41PM 143/89 89 HR    Bit concerned could her sxs relate to HTN? ? Thank Rodrick Steele  ----- Message -----  From: Andrew Reese MD  Sent: 6/10/2016 8:15 AM EDT  To: Damon Notice  Subject: RE: Non-Urgent Medical Question    Ignacio,  Thank you for the update. The blood transfusion is unlikely to have caused any thyroid issues. Have a good weekend. Dr. Fabricio Ward.      ----- Message -----   From: René Maurer   Sent: 6/9/2016 11:40 PM EDT   To: Andrew Reese MD  Subject: RE: Non-Urgent Shon Cushing Dr. Vidal Rickers,    I know this seems off topic,. Mom had a blood transfusion back in 2006 due to anemic status. , could that have predisposed her to thyroid condition? As far as head indentation dr. Aisha Bah stated thus as being normal and no need for ct.  Unless pain or headaches     Thank You  ----- Message -----  From: Andrew Reese MD  Sent: 1/4/2016 3:59 PM EST  To: Damon Notice  Subject: RE: Non-Urgent Medical Question    Ignacio,  Please go to the local urgent care or ER today since she is not feeling

## 2018-02-20 NOTE — TELEPHONE ENCOUNTER
Pt would like to get his mom in today to be seen. She is scheduled for 11:30 tomorrow. But he really wants her seen today for red spots and bumps on her neck .     Please Advise  Ann-Marie Henderson [phone]

## 2018-02-23 ENCOUNTER — TELEPHONE (OUTPATIENT)
Dept: FAMILY MEDICINE CLINIC | Age: 58
End: 2018-02-23

## 2018-03-23 ENCOUNTER — OFFICE VISIT (OUTPATIENT)
Dept: FAMILY MEDICINE CLINIC | Age: 58
End: 2018-03-23

## 2018-03-23 VITALS
HEIGHT: 63 IN | RESPIRATION RATE: 16 BRPM | SYSTOLIC BLOOD PRESSURE: 130 MMHG | BODY MASS INDEX: 27.85 KG/M2 | OXYGEN SATURATION: 99 % | DIASTOLIC BLOOD PRESSURE: 78 MMHG | HEART RATE: 82 BPM | WEIGHT: 157.2 LBS | TEMPERATURE: 98.7 F

## 2018-03-23 DIAGNOSIS — R21 RASH: Primary | ICD-10-CM

## 2018-03-23 PROCEDURE — G8417 CALC BMI ABV UP PARAM F/U: HCPCS | Performed by: FAMILY MEDICINE

## 2018-03-23 PROCEDURE — 1036F TOBACCO NON-USER: CPT | Performed by: FAMILY MEDICINE

## 2018-03-23 PROCEDURE — G8484 FLU IMMUNIZE NO ADMIN: HCPCS | Performed by: FAMILY MEDICINE

## 2018-03-23 PROCEDURE — 99214 OFFICE O/P EST MOD 30 MIN: CPT | Performed by: FAMILY MEDICINE

## 2018-03-23 PROCEDURE — 3014F SCREEN MAMMO DOC REV: CPT | Performed by: FAMILY MEDICINE

## 2018-03-23 PROCEDURE — G8427 DOCREV CUR MEDS BY ELIG CLIN: HCPCS | Performed by: FAMILY MEDICINE

## 2018-03-23 PROCEDURE — 3017F COLORECTAL CA SCREEN DOC REV: CPT | Performed by: FAMILY MEDICINE

## 2018-03-23 ASSESSMENT — ENCOUNTER SYMPTOMS
CONSTIPATION: 0
ANAL BLEEDING: 0
COUGH: 0
NAUSEA: 0
BLOOD IN STOOL: 0
VOMITING: 0
SHORTNESS OF BREATH: 0
CHOKING: 0
CHEST TIGHTNESS: 0
DIARRHEA: 0
WHEEZING: 0
APNEA: 0
ABDOMINAL PAIN: 0
STRIDOR: 0
RECTAL PAIN: 0
ABDOMINAL DISTENTION: 0

## 2018-03-23 NOTE — PROGRESS NOTES
Subjective:      Patient ID: Mdoesta Ulloa is a 62 y.o. female. HPI    New problem:  Rash:right side of forehead [id number]. Associated with mild prickling type sensation. Seen by her dermatologist 2weeks ago(Dr. Jennings):was informed the etiology was not clear. Pt' & his son are requesting 2nd opinion. Improves with nothing. No otc topical meds applied. Worsened(aggravated) by nothing. Improves by nothing. Sick contacts:none. Denies pus-exudate/fevers/chills/appetite changes/dizziness/n-v/no other lesions/new cosmetic or detergents. Allergies   Allergen Reactions    Aleve [Naproxen Sodium]     Delsym Cough Relief [Dextromethorphan Hbr]      dizziness    Haldol [Haloperidol Lactate]     Morphine And Related     Penicillins     Prochlorperazine Edisylate     Reglan [Metoclopramide Hcl]     Sulfa Antibiotics        Current Outpatient Prescriptions on File Prior to Visit   Medication Sig Dispense Refill    methimazole (TAPAZOLE) 5 MG tablet Take 5 mg by mouth      alclomethasone (ACLOVATE) 0.05 % cream Apply topically       No current facility-administered medications on file prior to visit. Past Medical History:   Diagnosis Date    Acne vulgaris     Anxiety 7/25/2014    Degenerative arthritis of thoracic spine 6/2010    Depression 1/8/2015    Dizziness     Under care of neurologist(Dr. Rock Garcia)    Fibromyalgia     GERD (gastroesophageal reflux disease) 2008    Goiter     Headache(784.0)     Under care of neurologist(Dr. Rock Garcia)    HTN (hypertension)     clonidine per endo:Dr. Jerad Garcia Hypercholesterolemia     under care of endo    Hyperthyroidism     Under care endo(Dr. Odin Thapa)    Meniere's disease 1995    Under care of ENT(initially Dr Kiersten Paniagua. Current ENT=Dr.Umesh Schaffer:tel [phone]. see h&p 8/20/2009:updated 8/20/2009    Mitral regurgitation     As per echo 15yrs ago: records pending:updated 8/20/2009.  Nuclear stress test 5/20/10=nml as per Dr. Delonte Miller)   [facility] Pap smear     2008:[facility] Dr. Nerissa San.  Screening colonoscopy     Declining testing:will notify Dr. Mona Chandler when desired:updated 5/24/11;update 10/8/13    Screening mammogram     Declining testing:will notify Dr. Mona Chandler when desired:updated 5/24/11;10/8/13    subacromial/subdeltoid bursitis:left shoulder 11/12/2013    Syncope and collapse     Tendinosis:Left rotator cuff 11/12/2013    Uterine leiomyoma - fibroids     Required 2 units blood transfusion secondary to Hgb. 7.0. No surgery b/c pat. declined in 2003. Asx & no blood loss since 2003.  Vitamin D deficiency     As per endo           Social History   Substance Use Topics    Smoking status: Never Smoker    Smokeless tobacco: Never Used    Alcohol use No     History   Drug Use No           Review of Systems   Constitutional: Negative for activity change, appetite change, chills, diaphoresis, fatigue, fever and unexpected weight change. Eyes: Negative for visual disturbance. Respiratory: Negative for apnea, cough, choking, chest tightness, shortness of breath, wheezing and stridor. Cardiovascular: Negative for chest pain, palpitations and leg swelling. Gastrointestinal: Negative for abdominal distention, abdominal pain, anal bleeding, blood in stool, constipation, diarrhea, nausea, rectal pain and vomiting. Skin: Positive for rash. Negative for wound. Neurological: Negative for light-headedness and headaches. Hematological: Negative for adenopathy. Objective:   Physical Exam   Constitutional: She is oriented to person, place, and time. Vital signs are normal. She appears well-developed and well-nourished. She is cooperative. She does not have a sickly appearance. No distress.    HENT:   Nose: Nose normal.   Mouth/Throat: Uvula is midline, oropharynx is clear and moist and mucous membranes are normal.   Hearing intact to nml conversation   Eyes: Conjunctivae, EOM and lids are normal. Pupils are equal, round, and reactive to light. Neck: Trachea normal and normal range of motion. Neck supple. No Brudzinski's sign noted. Cardiovascular: Normal rate, regular rhythm, normal heart sounds, intact distal pulses and normal pulses. Pulmonary/Chest: Effort normal and breath sounds normal.   CTAB,good AE bilaterally   Abdominal: Soft. Normal appearance and bowel sounds are normal. She exhibits no distension and no mass. There is no splenomegaly or hepatomegaly. There is no tenderness. There is no rebound and no guarding. Lymphadenopathy:     She has no cervical adenopathy. No supraclavicular LAD. Neurological: She is alert and oriented to person, place, and time. She has normal reflexes. No cranial nerve deficit or sensory deficit. Skin: Skin is warm, dry and intact. Rash noted. No abrasion and no bruising noted. Rash is not pustular. No cyanosis. Right forehead erythematous dermatitis type. No cellulitis-abscess. No TTP. Good skin turgor. Capillary refill=2-3 secs. Psychiatric: She has a normal mood and affect. Assessment:      1. Rash;right forehead  VSS/well appearing. Etiology not clear. Seen by derm recently w/no clear etiology. 2nd opinion from derm. May try trial of otc hydrocortisone 1% topical bid to small area of rash to see if this helps:pt' will call back with update in 2-3days. David Mendoza MD           Plan:      Call or return to clinic prn if these symptoms worsen or fail to improve as anticipated. Pt' left office in good condition. Advised to go to local ER or call 911 for any worrisome signs/sx including but not limited to worsening of current complaint.

## 2018-03-26 ENCOUNTER — TELEPHONE (OUTPATIENT)
Dept: FAMILY MEDICINE CLINIC | Age: 58
End: 2018-03-26

## 2018-03-27 NOTE — TELEPHONE ENCOUNTER
FYI call from endo:pt' is non-compliant with her thyroid medication:endo is addressing this with her. Message closed.

## 2018-03-28 ENCOUNTER — PATIENT MESSAGE (OUTPATIENT)
Dept: FAMILY MEDICINE CLINIC | Age: 58
End: 2018-03-28

## 2018-03-28 DIAGNOSIS — E05.90 HYPERTHYROIDISM: Primary | ICD-10-CM

## 2018-03-28 DIAGNOSIS — E04.2 MULTINODULAR GOITER: ICD-10-CM

## 2018-03-28 NOTE — TELEPHONE ENCOUNTER
From: Bryan Rich  To: Daniel Weiner MD  Sent: 3/28/2018 2:42 PM EDT  Subject: Non-Urgent Junius Haver Dr. Shimon Chaves,    We wanted to request if you can please see if Dr. Bob Butcher can see us sooner then April 17 appointment? If you send a note maybe she can see us sooner that way mom can get her best opinion from her right now since she's having sx's of skin etc.     Thank You. Sarah Jefferson  ----- Message -----  From: Daniel Weiner MD  Sent: 3/25/2018 8:26 AM EDT  To: Bryan Rich  Subject: RE: Non-Urgent Medical Question  Ignacio,  It is ok to take claritin. Have a good weekend. Dr. Shimon Chaves.      ----- Message -----   From: Bryan Rich   Sent: 3/24/2018 4:10 PM EDT   To: Daniel Weiner MD  Subject: RE: Non-Urgent Junius Haver Dr. Shimon Chaves,    Mom and I forgot to ask if it was okay for her to take Claritin for the skin issues she came to get evaluated? Thank You. Sarah Jefferson      ----- Message -----  From: Daniel Weiner MD  Sent: 10/8/2017 6:00 PM EDT  To: Bryan Rich  Subject: RE: Non-Urgent Medical Question  Ignacio,  Please request these lab orders from her endocrinologist. Thank you.      ----- Message -----   From: Bryan Rich   Sent: 10/8/2017 2:46 PM EDT   To: Daniel Weiner MD  Subject: Non-Urgent Junius Haver Dr. Shimon Chaves,    We forgot to ask on moms last appt. , are you able to order thyroid labs TSH, Thyroxine Free T4, and Free T3 to make sure her thyroid is functioning ok. We would greatly appreciate it. Sarah Jefferson  ----- Message -----  From: Daniel Weiner MD  Sent: 7/18/2017 11:34 AM EDT  To: Bryan Rich  Subject: RE: Non-Urgent Medical Question  Thank you for the update.      ----- Message -----   From: Bryan Rich   Sent: 7/18/2017 11:29 AM EDT   To: Daniel Weiner MD  Subject: RE: Non-Urgent Junius Haver Dr. Shimon Chaves,    Yes it's first time mom had an allergic reaction she scheduled an appt with dr Damien Mcneal.     Mom Concerned if allergic reaction had anything to do with scracth or if a virus or infection was passed on by doctor scratch. Or allergic reaction will discuss with dermatologist   ----- Message -----  From: Sushma Knowles MD  Sent: 7/17/2017 8:41 PM EDT  To: Lucas Walters  Subject: RE: Non-Urgent Medical Question    The reason for the described reaction is not known. If the rash returns then please schedule an office appointment for evaluation or see her dermatologist.  It is ok to stop using the mupirocin ointment. since she is doing better. ----- Message -----   From: Lucas Walters   Sent: 7/17/2017 6:03 PM EDT   To: Sushma Knowles MD  Subject: RE: Non-Urgent Arely Gomez experienced an allergic reaction ; rash all over with itching and on the neck also where nail scratch is and minor swelling. She saw Dr. Alphonso Rubio dermatology who said to us ok to use your ointment mupirocin last Friday is when she experienced the reaction could overuse if it cause this or infection passed on by that doctor scratch that's in now her body the infection I think is gone but scar is still there mom us scared from the allergic reaction I gave her Benadryl and it went away hasn't occurred since only think she took was methimazole 2.5mg . We don't know if that's doctors virus caused this or it just happened . Moms stop the ointment and methimazole too  ----- Message -----  From: Sushma Knowles MD  Sent: 5/26/2017 11:05 AM EDT  To: Lucas Walters  Subject: RE: Non-Urgent Medical Question    Thank you for the update.      ----- Message -----   From: Lucas Walters   Sent: 5/26/2017 10:40 AM EDT   To: Sushma Knowles MD  Subject: RE: Non-Urgent Medical Question    Hi Dr. Issa Wong mom thought she had one done in 2009 or 2010. She's scheduled to see Dr. Anamika Astorga Tuesday. It's frustrating that mom is experiencing these sx's for too long and all specialist are ignoring her back and forth .     Ever since mom fell had minor shoulder swelling headache these sx's started  ----- Message -----  From: Sushma Knowles MD  Sent: 5/26/2017 6:07 AM EDT  To: Lucas Walters  Subject: RE: Non-Urgent Medical Question    There is no DEXA scan report seen in her chart. Check with her endocrinologist if they recommend she obtain one. They can order the test if recommended. ----- Message -----   From: Lucas Walters   Sent: 5/25/2017 8:25 PM EDT   To: Sushma Knowles MD  Subject: RE: Non-Urgent Arley Flood,    Did mom ever have [facility] scan maybe 2009? [facility]  ----- Message -----  From: Sushma Knowles MD  Sent: 1/23/2017 1:58 PM EST  To: Lucas Walters  Subject: RE: Non-Urgent Medical Question    Ignacio,  Please request blood work from her gynecologist since these are female hormone related. Ferritin lab order has been ordered/faxed over. [address] [place], [place] [address]Dr. Yari.      ----- Message -----   From: Lucas Walters   Sent: 1/23/2017 1:47 PM EST   To: Sushma Knowles MD  Subject: RE: Non-Urgent Arley Mcgee had thyroid markers done can you order if possible luteinizing hormone follicle stimulating hormone estradiol and progesterone and ferritin and fax them to [facility] [phone]      I would greatly appreciate. Thank You.  ----- Message -----  From: Sushma Knowles MD  Sent: 10/10/2016 9:01 PM EDT  To: Lucas Walters  Subject: RE: Non-Urgent Medical Question    Ignacio,  Ferritin level is normal.  Please request your mom's endocrinologist to order the thyroid ultrasound. Hair loss:consult with her dermatologist is recommended[facility]        ----- Message -----   From: Lucas Walters   Sent: 10/10/2016 7:11 PM EDT   To: Sushma Knowles MD  Subject: RE: Non-Urgent Arley Reeves had lab work done for ferritin level. Is this normal? Mom is experiencing hair loss since 3-4 months.      Component Your Value Standard Range  Ferritin  19.8 ng/mL  11.0 - 306.8 ng/mL    Also doctor is it HR. Is this to be concerned she is experiencing since morning sxs of light headed. 2:41pm 145/104 93 HR  3:41PM 143/89 89 HR    Bit concerned could her sxs relate to HTN? ? Thank Janis Mccollum  ----- Message -----  From: Justin Farr MD  Sent: 6/10/2016 8:15 AM EDT  To: Nii Amin  Subject: RE: Non-Urgent Medical Question    Ignacio,  Thank you for the update. The blood transfusion is unlikely to have caused any thyroid issues. Have a good weekend. Dr. Pedro Garcia.      ----- Message -----   From: Alicia Dear   Sent: 6/9/2016 11:40 PM EDT   To: Justin Farr MD  Subject: RE: Non-Urgent Maria L Garcia,    I know this seems off topic,. Mom had a blood transfusion back in 2006 due to anemic status. , could that have predisposed her to thyroid condition? As far as head indentation dr. Matthias Guzmán stated thus as being normal and no need for ct. Unless pain or headaches     Thank You  ----- Message -----  From: Justin Farr MD  Sent: 1/4/2016 3:59 PM EST  To: Nii Amin  Subject: RE: Non-Urgent Medical Question    Ignacio,  Please go to the local urgent care or ER today since she is not feeling better. Family history of heart disease:this can be reviewed during her upcoming visit. Dr. Pedro Garcia.      ----- Message -----   From: Jose Childers: 1/4/2016 2:09 PM EST   To: Justin Farr MD  Subject: RE: Non-Urgent Maria L Garcia,    Mom finished the zpak yesterday, still having sx's runny nose, dry cough, cold chills in evening, body feel weak, lightheaded, itch throat . I am not sure if she needs another round of antibiotics, no greenish mucous all clear OR if it's allergies? .    Mom also is considering taking CombiPatch , since all other test have cleared her of any other possibilities. In our family, we have heart history, our grandparents and uncle passed away of heart attack or heart related.    ----- Message -----  From: Justin Farr MD  Sent: 12/30/2015 9:43 PM EST  To: Wayne Grace  Subject: RE: Non-Urgent Medical Question    Donny Saleem,  Thank you for the update. If she is not improving or her symptoms worsen then return to the same urgent care. Please keep appointment with me on 1/7 for follow up & to review her concerns. Dr. Kimberlee Lerma.      ----- Message -----   From: Mekhi Sky: 12/30/2015 5:32 PM EST   To: Reginal Leventhal, MD  Subject: RE: Non-Urgent Rojas Watkins is experiencing chills especially in the evening, runny nose clear, lightheaded and muscle weakness is old sx's. Is chill considered to be related to cold? I took mom to urgent care, they gave her zpak, and she's taking dayquil after consulting with Dr. Pia Kim since her thyroid can be aggravated. Now can Menopause be causing these vague sx's if it's not common cold? Thyroid? I wanted to bring her to your office but you were booked till next week. Donny Saleem  ----- Message -----  From: Reginal Leventhal, MD  Sent: 12/28/2015 4:02 PM EST  To: Wayne Grace  Subject: RE: Non-Urgent Medical Question    Please schedule hospital follow up appointment at your earliest convenience.       ----- Message -----   From: Fadia Nunes   Sent: 12/28/2015 2:34 PM EST   To: Reginal Leventhal, MD  Subject: RE: Non-Urgent Rojas Lerma,    I will try to take her to urgent care, she was seen at [facility] 1x week ago for symptom

## 2018-03-29 ENCOUNTER — TELEPHONE (OUTPATIENT)
Dept: ENDOCRINOLOGY | Age: 58
End: 2018-03-29

## 2018-04-02 ENCOUNTER — TELEPHONE (OUTPATIENT)
Dept: FAMILY MEDICINE CLINIC | Age: 58
End: 2018-04-02

## 2018-04-12 ENCOUNTER — TELEPHONE (OUTPATIENT)
Dept: FAMILY MEDICINE CLINIC | Age: 58
End: 2018-04-12

## 2018-04-17 ENCOUNTER — OFFICE VISIT (OUTPATIENT)
Dept: ENDOCRINOLOGY | Age: 58
End: 2018-04-17

## 2018-04-17 VITALS
DIASTOLIC BLOOD PRESSURE: 82 MMHG | HEART RATE: 96 BPM | BODY MASS INDEX: 28 KG/M2 | HEIGHT: 63 IN | SYSTOLIC BLOOD PRESSURE: 128 MMHG | OXYGEN SATURATION: 98 % | WEIGHT: 158 LBS

## 2018-04-17 DIAGNOSIS — E04.2 MULTINODULAR GOITER: ICD-10-CM

## 2018-04-17 DIAGNOSIS — Z78.0 MENOPAUSE: ICD-10-CM

## 2018-04-17 DIAGNOSIS — E05.90 HYPERTHYROIDISM: Primary | ICD-10-CM

## 2018-04-17 DIAGNOSIS — E05.00 GRAVES' DISEASE: ICD-10-CM

## 2018-04-17 DIAGNOSIS — R73.01 IMPAIRED FASTING GLUCOSE: ICD-10-CM

## 2018-04-17 DIAGNOSIS — E78.2 MIXED HYPERLIPIDEMIA: ICD-10-CM

## 2018-04-17 DIAGNOSIS — E55.9 VITAMIN D DEFICIENCY: ICD-10-CM

## 2018-04-17 PROBLEM — E04.9 GOITER: Status: ACTIVE | Noted: 2018-04-17

## 2018-04-17 PROCEDURE — 1036F TOBACCO NON-USER: CPT | Performed by: INTERNAL MEDICINE

## 2018-04-17 PROCEDURE — G8417 CALC BMI ABV UP PARAM F/U: HCPCS | Performed by: INTERNAL MEDICINE

## 2018-04-17 PROCEDURE — 3017F COLORECTAL CA SCREEN DOC REV: CPT | Performed by: INTERNAL MEDICINE

## 2018-04-17 PROCEDURE — 3014F SCREEN MAMMO DOC REV: CPT | Performed by: INTERNAL MEDICINE

## 2018-04-17 PROCEDURE — 99214 OFFICE O/P EST MOD 30 MIN: CPT | Performed by: INTERNAL MEDICINE

## 2018-04-17 PROCEDURE — G8427 DOCREV CUR MEDS BY ELIG CLIN: HCPCS | Performed by: INTERNAL MEDICINE

## 2018-04-23 ENCOUNTER — TELEPHONE (OUTPATIENT)
Dept: ENDOCRINOLOGY | Age: 58
End: 2018-04-23

## 2018-04-23 DIAGNOSIS — E05.90 HYPERTHYROIDISM: Primary | ICD-10-CM

## 2018-04-25 RX ORDER — METHIMAZOLE 5 MG/1
2.5 TABLET ORAL DAILY
Qty: 30 TABLET | Refills: 3 | Status: SHIPPED | OUTPATIENT
Start: 2018-04-25 | End: 2018-07-31 | Stop reason: SDUPTHER

## 2018-04-28 ENCOUNTER — TELEPHONE (OUTPATIENT)
Dept: ENDOCRINOLOGY | Age: 58
End: 2018-04-28

## 2018-05-03 ENCOUNTER — PATIENT MESSAGE (OUTPATIENT)
Dept: FAMILY MEDICINE CLINIC | Age: 58
End: 2018-05-03

## 2018-05-03 DIAGNOSIS — R21 RASH: Primary | ICD-10-CM

## 2018-07-31 ENCOUNTER — OFFICE VISIT (OUTPATIENT)
Dept: ENDOCRINOLOGY | Age: 58
End: 2018-07-31

## 2018-07-31 VITALS
HEIGHT: 63 IN | HEART RATE: 92 BPM | DIASTOLIC BLOOD PRESSURE: 72 MMHG | WEIGHT: 155.6 LBS | OXYGEN SATURATION: 99 % | SYSTOLIC BLOOD PRESSURE: 124 MMHG | BODY MASS INDEX: 27.57 KG/M2

## 2018-07-31 DIAGNOSIS — E78.2 MIXED HYPERLIPIDEMIA: ICD-10-CM

## 2018-07-31 DIAGNOSIS — E05.90 HYPERTHYROIDISM: Primary | ICD-10-CM

## 2018-07-31 DIAGNOSIS — Z78.0 MENOPAUSE: ICD-10-CM

## 2018-07-31 DIAGNOSIS — E55.9 VITAMIN D DEFICIENCY: ICD-10-CM

## 2018-07-31 DIAGNOSIS — R73.01 IMPAIRED FASTING GLUCOSE: ICD-10-CM

## 2018-07-31 DIAGNOSIS — E04.2 MULTINODULAR GOITER: ICD-10-CM

## 2018-07-31 DIAGNOSIS — E05.00 GRAVES' DISEASE: ICD-10-CM

## 2018-07-31 PROCEDURE — G8428 CUR MEDS NOT DOCUMENT: HCPCS | Performed by: INTERNAL MEDICINE

## 2018-07-31 PROCEDURE — 3017F COLORECTAL CA SCREEN DOC REV: CPT | Performed by: INTERNAL MEDICINE

## 2018-07-31 PROCEDURE — 99214 OFFICE O/P EST MOD 30 MIN: CPT | Performed by: INTERNAL MEDICINE

## 2018-07-31 PROCEDURE — G8417 CALC BMI ABV UP PARAM F/U: HCPCS | Performed by: INTERNAL MEDICINE

## 2018-07-31 PROCEDURE — 1036F TOBACCO NON-USER: CPT | Performed by: INTERNAL MEDICINE

## 2018-07-31 RX ORDER — METHIMAZOLE 5 MG/1
2.5 TABLET ORAL DAILY
Qty: 30 TABLET | Refills: 3 | Status: SHIPPED | OUTPATIENT
Start: 2018-07-31 | End: 2018-11-05 | Stop reason: SDUPTHER

## 2018-07-31 ASSESSMENT — PATIENT HEALTH QUESTIONNAIRE - PHQ9
SUM OF ALL RESPONSES TO PHQ QUESTIONS 1-9: 0
1. LITTLE INTEREST OR PLEASURE IN DOING THINGS: 0
2. FEELING DOWN, DEPRESSED OR HOPELESS: 0
SUM OF ALL RESPONSES TO PHQ9 QUESTIONS 1 & 2: 0

## 2018-07-31 NOTE — PROGRESS NOTES
present.  No focally suspicious nodules seen. 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 FINDINGS:    The right thyroid lobe measures 5.8 x 3.2 x 4.4 cm.  The left thyroid lobe measures 5.9 x 3.3 x 3.3 cm. .  Multiple solid and cystic nodules are seen throughout the thyroid gland most compatible with multinodular goiter. 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 FINDINGS:    The right thyroid lobe measures 5.8 x 3.2 x 4.4 cm. The left thyroid lobe measures 5.9 x 3.3 x 3.3 cm. .  Multiple solid and cystic nodules are seen throughout the thyroid gland most compatible with multinodular goiter. IMPRESSION      Multiple thyroid nodules. This is most consistent with multinodular goiter. No dominant nodule present. No focally suspicious nodules seen. Status Results Details       Hospital Encounter on 4/26/2018  [facility] \")' href=\"[id number]">[email or web address] Summary        Gland sonoarchitecture is diffusely heterogeneous. Sonographic appearance similar to prior studies. Result Narrative     HISTORY:       .     .Thyrotoxicosis with toxic multinodular goiter without thyrotoxic crisis or storm    COMPARISON: Multiple prior studies    FINDINGS:    Right lobe: 7.2 x 3.4 x 4 cm. Sonoarchitecture is diffusely heterogeneous. In a similar location to previous, there is a poorly defined, stable nodule measuring 2 x 1.2 cm    Left lobe lobe: 7.4 x 3.7 x 3.1 cm. Sonoarchitecture is diffusely heterogeneous. Poorly bordered nodules appear stable. COMMENT:  Please note that this report was generated using voice recognition software.  If there are questions about content or Social History    Marital status:      Spouse name: N/A    Number of children: N/A    Years of education: N/A     Social History Main Topics    Smoking status: Never Smoker    Smokeless tobacco: Never Used    Alcohol use No    Drug use: No    Sexual activity: Not Asked     Other Topics Concern    None     Social History Narrative    None     Current Outpatient Prescriptions   Medication Sig Dispense Refill    methimazole (TAPAZOLE) 5 MG tablet Take 0.5 tablets by mouth daily 30 tablet 3     No current facility-administered medications for this visit. Allergies   Allergen Reactions    Aleve [Naproxen Sodium]     Delsym Cough Relief [Dextromethorphan Hbr]      dizziness    Haldol [Haloperidol Lactate]     Morphine And Related     Penicillins     Prochlorperazine Edisylate     Reglan [Metoclopramide Hcl]     Sulfa Antibiotics      Family Status   Relation Status    Other (Not Specified)    Sister Alive        thyroid nodules.      Mother     Father     Sister (Not Specified)    Brother (Not Specified)       Review of Systems:  Constitutional: has fatigue, no fever, no recent weight gain, no recent weight loss, no changes in appetite  Eyes: no eye pain, no change in vision, no eye redness, no eye irritation, no double vision  Ears, nose, throat: no nasal congestion, no sore throat, no earache, has decrease in hearing, no hoarseness, no dry mouth, no sinus problems, no difficulty swallowing, no neck lumps, no mouth sores, no ringing in ears  Pulmonary: no shortness of breath, no wheezing, no dyspnea on exertion, no cough  Cardiovascular: no chest pain, no lower extremity edema, no orthopnea, no intermittent leg claudication, no palpitations  Gastrointestinal: no abdominal pain, no nausea, no vomiting, no diarrhea, has constipation, no heartburn, no bloating  Genitourinary: no dysuria, no urinary incontinence, no urinary hesitancy, no change in urinary frequency, no counseling. Return in about 3 months (around 10/31/2018) for thyroid problems.

## 2018-10-08 ENCOUNTER — OFFICE VISIT (OUTPATIENT)
Dept: FAMILY MEDICINE CLINIC | Age: 58
End: 2018-10-08
Payer: COMMERCIAL

## 2018-10-08 VITALS
DIASTOLIC BLOOD PRESSURE: 82 MMHG | BODY MASS INDEX: 27.38 KG/M2 | SYSTOLIC BLOOD PRESSURE: 110 MMHG | WEIGHT: 154.5 LBS | OXYGEN SATURATION: 98 % | HEART RATE: 97 BPM | TEMPERATURE: 98.7 F | HEIGHT: 63 IN | RESPIRATION RATE: 16 BRPM

## 2018-10-08 DIAGNOSIS — M54.6 ACUTE BILATERAL THORACIC BACK PAIN: Primary | ICD-10-CM

## 2018-10-08 DIAGNOSIS — Z28.21 INFLUENZA VACCINATION DECLINED BY PATIENT: ICD-10-CM

## 2018-10-08 PROCEDURE — 99214 OFFICE O/P EST MOD 30 MIN: CPT | Performed by: FAMILY MEDICINE

## 2018-10-08 ASSESSMENT — ENCOUNTER SYMPTOMS
ANAL BLEEDING: 0
WHEEZING: 0
CHOKING: 0
ABDOMINAL PAIN: 0
BACK PAIN: 1
DIARRHEA: 0
STRIDOR: 0
SHORTNESS OF BREATH: 0
VOMITING: 0
APNEA: 0
ABDOMINAL DISTENTION: 0
RECTAL PAIN: 0
COUGH: 0
CHEST TIGHTNESS: 0
BLOOD IN STOOL: 0
CONSTIPATION: 0
NAUSEA: 0

## 2018-10-08 NOTE — PROGRESS NOTES
Subjective:      Patient ID: Joo Galicia is a 62 y.o. female. HPI  New problem:  Presenting w/mild sharp mid-back pain x 7days. Preceding injury:none recalled. Associated w/nothing. Improves w/rest.  Worsens w/back flexion. Denies BLE ziunsffr-jsvvsyzxkkr-jynhvpuvp/urinary or bowel control loss or change/saddle anesthesia/gait abnormality. Allergies   Allergen Reactions    Aleve [Naproxen Sodium]     Delsym Cough Relief [Dextromethorphan Hbr]      dizziness    Haldol [Haloperidol Lactate]     Morphine And Related     Penicillins     Prochlorperazine Edisylate     Reglan [Metoclopramide Hcl]     Sulfa Antibiotics        Current Outpatient Prescriptions on File Prior to Visit   Medication Sig Dispense Refill    methimazole (TAPAZOLE) 5 MG tablet Take 0.5 tablets by mouth daily 30 tablet 3     No current facility-administered medications on file prior to visit. Past Medical History:   Diagnosis Date    Acne vulgaris     Anxiety 7/25/2014    Degenerative arthritis of thoracic spine 6/2010    Depression 1/8/2015    Dizziness     Under care of neurologist(Dr. Maryann Betancourt)    Fibromyalgia     GERD (gastroesophageal reflux disease) 2008    Goiter     Headache(784.0)     Under care of neurologist(Dr. Maryann Betancourt)    HTN (hypertension)     clonidine per endo:Dr. Kevyn Rock Hypercholesterolemia     under care of endo    Hyperthyroidism     Under care endo(Dr. Chapin Barajas)    Meniere's disease 1995    Under care of ENT(initially Dr Monika Mendoaz. Current ENT=Dr.Umesh Schaffer:tel [phone]. see h&p 8/20/2009:updated 8/20/2009    Mitral regurgitation     As per echo 15yrs ago: records pending:updated 8/20/2009. Nuclear stress test 5/20/10=nml as per Dr. Wilberto Martinez)    Pap smear     2008:[facility] Dr. Lakia Blanchard.     Screening colonoscopy     Declining testing:will notify Dr. Swetha Ricks when desired:updated 5/24/11;update 10/8/13    Screening mammogram     Declining testing:will notify Abdominal: Soft. Normal appearance and bowel sounds are normal. She exhibits no distension and no mass. There is no splenomegaly or hepatomegaly. There is no tenderness. There is no rebound and no guarding. Musculoskeletal:        Right hip: Normal.        Left hip: Normal.        Cervical back: Normal.        Thoracic back: She exhibits tenderness. She exhibits normal range of motion, no bony tenderness, no swelling, no edema, no deformity, no laceration and normal pulse. Lumbar back: She exhibits normal range of motion, no tenderness, no bony tenderness, no swelling, no edema, no deformity, no laceration, no pain, no spasm and normal pulse. Back:    X marks area of back pain:+TTP. Negative SLR bilaterally. No signs/sx/exam findings concerning for cauda equina syndrome. Lymphadenopathy:     She has no cervical adenopathy. No supraclavicular LAD. Neurological: She is alert and oriented to person, place, and time. She has normal reflexes. Skin: Skin is warm, dry and intact. No rash noted. She is not diaphoretic. No pallor. Good skin turgor. Capillary refill=2-3 secs. Psychiatric: She has a normal mood and affect. Good eye contact. Assessment:       Diagnosis Orders   1. Acute bilateral thoracic back pain  VSS/well appearing. Likely muscle back strain. Home exercises & otc tylenol. Max daily tylenol 3g daily. Xray eval if not improving. PT referral declined by pt'. XR THORACIC SPINE (2 VIEWS)   2. Influenza vaccination declined by patient  Pt' & her son state they will likely return as nurse visit for vacinnation another day. Plan:      Pt' left office in good condition. Obtain labs/diagnostic tests as discussed today & call back for results within 2-7days.   Advised to go to local ER or call 911 for any worrisome signs/sx including but not limited to worsening of current complaint or development of         Cindy Ayers MD

## 2018-10-08 NOTE — PATIENT INSTRUCTIONS
relax.  8. Repeat 2 to 4 times. Relax and rest    5. Lie on your back with a rolled towel under your neck and a pillow under your knees. Extend your arms comfortably to your sides. 6. Relax and breathe normally. 7. Remain in this position for about 10 minutes. 8. If you can, do this 2 or 3 times each day. Follow-up care is a key part of your treatment and safety. Be sure to make and go to all appointments, and call your doctor if you are having problems. It's also a good idea to know your test results and keep a list of the medicines you take. Where can you learn more? Go to [email or web address]"[email or web address]. org and sign in to your [employer] account. Enter P376 in the Guarnic box to learn more about \"Back Stretches: Exercises. \"     If you do not have an account, please click on the \"Sign Up Now\" link. Current as of: November 29, 2017  Content Version: 11.7  © 6869-9634 [employer]. Care instructions adapted under license by [facility] ([facility]). If you have questions about a medical condition or this instruction, always ask your healthcare professional. [address] any warranty or liability for your use of this information.

## 2018-11-05 ENCOUNTER — OFFICE VISIT (OUTPATIENT)
Dept: ENDOCRINOLOGY | Age: 58
End: 2018-11-05
Payer: COMMERCIAL

## 2018-11-05 VITALS — HEART RATE: 96 BPM | HEIGHT: 63 IN | BODY MASS INDEX: 27.93 KG/M2 | OXYGEN SATURATION: 100 % | WEIGHT: 157.6 LBS

## 2018-11-05 DIAGNOSIS — E05.90 HYPERTHYROIDISM: Primary | ICD-10-CM

## 2018-11-05 DIAGNOSIS — Z78.0 MENOPAUSE: ICD-10-CM

## 2018-11-05 DIAGNOSIS — E78.00 PURE HYPERCHOLESTEROLEMIA: ICD-10-CM

## 2018-11-05 DIAGNOSIS — R73.01 IMPAIRED FASTING GLUCOSE: ICD-10-CM

## 2018-11-05 DIAGNOSIS — E04.2 MULTINODULAR GOITER: ICD-10-CM

## 2018-11-05 DIAGNOSIS — E55.9 VITAMIN D DEFICIENCY: ICD-10-CM

## 2018-11-05 DIAGNOSIS — E05.00 GRAVES' DISEASE: ICD-10-CM

## 2018-11-05 PROCEDURE — 99214 OFFICE O/P EST MOD 30 MIN: CPT | Performed by: INTERNAL MEDICINE

## 2018-11-05 PROCEDURE — 3017F COLORECTAL CA SCREEN DOC REV: CPT | Performed by: INTERNAL MEDICINE

## 2018-11-05 PROCEDURE — G8484 FLU IMMUNIZE NO ADMIN: HCPCS | Performed by: INTERNAL MEDICINE

## 2018-11-05 PROCEDURE — G8427 DOCREV CUR MEDS BY ELIG CLIN: HCPCS | Performed by: INTERNAL MEDICINE

## 2018-11-05 PROCEDURE — G8417 CALC BMI ABV UP PARAM F/U: HCPCS | Performed by: INTERNAL MEDICINE

## 2018-11-05 PROCEDURE — 1036F TOBACCO NON-USER: CPT | Performed by: INTERNAL MEDICINE

## 2018-11-05 RX ORDER — METHIMAZOLE 5 MG/1
2.5 TABLET ORAL DAILY
Qty: 30 TABLET | Refills: 5 | Status: SHIPPED | OUTPATIENT
Start: 2018-11-05 | End: 2019-02-21 | Stop reason: SDUPTHER

## 2018-11-05 NOTE — PROGRESS NOTES
nodule present.  No focally suspicious nodules seen. 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 FINDINGS:    The right thyroid lobe measures 5.8 x 3.2 x 4.4 cm.  The left thyroid lobe measures 5.9 x 3.3 x 3.3 cm. .  Multiple solid and cystic nodules are seen throughout the thyroid gland most compatible with multinodular goiter. 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 FINDINGS:    The right thyroid lobe measures 5.8 x 3.2 x 4.4 cm. The left thyroid lobe measures 5.9 x 3.3 x 3.3 cm. .  Multiple solid and cystic nodules are seen throughout the thyroid gland most compatible with multinodular goiter. IMPRESSION      Multiple thyroid nodules. This is most consistent with multinodular goiter. No dominant nodule present. No focally suspicious nodules seen. Status Results Details       Hospital Encounter on 4/26/2018  [facility] \")' href=\"[email or web address]">YUYTRICHD Summary        Gland sonoarchitecture is diffusely heterogeneous. Sonographic appearance similar to prior studies. Result Narrative     HISTORY:       .     .Thyrotoxicosis with toxic multinodular goiter without thyrotoxic crisis or storm    COMPARISON: Multiple prior studies    FINDINGS:    Right lobe: 7.2 x 3.4 x 4 cm. Sonoarchitecture is diffusely heterogeneous. In a similar location to previous, there is a poorly defined, stable nodule measuring 2 x 1.2 cm    Left lobe lobe: 7.4 x 3.7 x 3.1 cm. Sonoarchitecture is diffusely heterogeneous. Poorly bordered nodules appear stable. COMMENT:  Please note that this report was generated using voice recognition software.  If there are questions about Complicated migraine     Anxiety 2014    Insomnia 2014    Stress 2014    Elevated CSF protein 2014    Nausea & vomiting 2014    Syncope 2014    Meniere's vertigo 2014    Positive JIMMY (antinuclear antibody) 2014    Excessive sweating, local 2014    Facial weakness 2014    Right facial numbness 2014    Headache 05/15/2014    Hearing loss 2014    Tinnitus 2014    Meniere's disease 2014    Hearing loss in right ear 2014    Dizziness 2014    Tendinosis:Left rotator cuff 2013    subacromial/subdeltoid bursitis:left shoulder 2013    Left shoulder pain 10/08/2013    Rotator cuff injury:left 10/08/2013    Allergic rhinitis 2012    Acne vulgaris 2012    Impaired fasting glucose 2011    Family history of diabetes mellitus 2011    Elevated alkaline phosphatase level 2011    Hair loss 2011    Degenerative arthritis of thoracic spine 2010    Meniere disease 2010    Sweating 2010    Spells 2010    Weight loss 2010    Fibroid, uterine 2010    Pure hypercholesterolemia 2010    GERD (gastroesophageal reflux disease) 2010    Graves' disease 2010    Hyperthyroidism 02/10/2010    Multinodular goiter 02/10/2010    Vitamin D deficiency 02/10/2010    Symptomatic menopausal or female climacteric states 02/10/2010     Past Surgical History:   Procedure Laterality Date     SECTION      [id number] to fetal distress and elevated bp     Family History   Problem Relation Age of Onset    Diabetes Mother     Heart Disease Mother     Thyroid Disease Mother     Neuropathy Mother     High Cholesterol Mother     Parkinsonism Mother     Heart Disease Father     High Cholesterol Father     Thyroid Disease Sister     Diabetes Sister     Diabetes Brother     Heart Disease Brother      Social is enlarged, 3 times normal limit  Lymphatics: normal cervical lymph nodes, normal supraclavicular nodes  Pulmonary: no increased work of breathing or signs of respiratory distress, lungs are clear to auscultation  Cardiovascular: normal heart rate and rhythm, normal S1 and S2, no murmurs and pedal pulses and 2+ bilaterally, No edema  Abdomen: abdomen is soft, non-tender with no masses  Musculoskeletal: normal gait and station, exam of the digits and nails are normal  Neurological: normal coordination, normal general cortical function    Lab Review:  Lab Results   Component Value Date    TSH 0.40 01/19/2017    TSH 0.47 05/05/2014     No results found for: FREET4     ASSESSMENT/PLAN:  1. Hyperthyroidism  Continue MMI 2.5 mg daily. - Comprehensive Metabolic Panel; Future  - CBC Auto Differential; Future  - T3; Future  - T3, Free; Future  - T4; Future  - T4, Free; Future  - TSH without Reflex; Future    2. Multinodular goiter  Follow, she refused FNA. Nodules are stable per radiology. - US Head Neck Soft Tissue Thyroid; Future    3. Graves' disease  Follow. 4. Impaired fasting glucose  Diet, exercise. 5. Vitamin D deficiency    - Vitamin D 25 Hydroxy; Future    6. Hypercholesterolemia  Diet. 7. Menopause    - DEXA Bone Density Axial Skeleton; Future      Reviewed and/or ordered clinical lab results Yes  Reviewed and/or ordered radiology tests Yes   Reviewed and/or ordered other diagnostic tests No  Discussed test results with performing physician No  Independently reviewed image, tracing, or specimen No  Made a decision to obtain old records No  Reviewed old records Yes  Obtained history from other than patient No    Nona Sanders was counseled regarding symptoms of thyroid diagnosis, course and complications of disease if inadequately treated, side effects of medications, diagnosis, treatment options, and prognosis, risks, benefits, labs, imaging and other studies and treatment targets and goals.   She

## 2018-12-18 ENCOUNTER — TELEPHONE (OUTPATIENT)
Dept: FAMILY MEDICINE CLINIC | Age: 58
End: 2018-12-18

## 2018-12-18 NOTE — TELEPHONE ENCOUNTER
Raul 70    Pt has been coughing for a week.   Dry cough  No nasal discharge    Only symptom is coughing  Dayquil does not help    Wants to be seen

## 2019-01-03 ENCOUNTER — TELEPHONE (OUTPATIENT)
Dept: FAMILY MEDICINE CLINIC | Age: 59
End: 2019-01-03

## 2019-01-30 ENCOUNTER — TELEPHONE (OUTPATIENT)
Dept: ENDOCRINOLOGY | Age: 59
End: 2019-01-30

## 2019-02-19 ENCOUNTER — TELEPHONE (OUTPATIENT)
Dept: FAMILY MEDICINE CLINIC | Age: 59
End: 2019-02-19

## 2019-02-21 ENCOUNTER — OFFICE VISIT (OUTPATIENT)
Dept: INTERNAL MEDICINE CLINIC | Age: 59
End: 2019-02-21
Payer: COMMERCIAL

## 2019-02-21 VITALS
HEART RATE: 111 BPM | DIASTOLIC BLOOD PRESSURE: 70 MMHG | WEIGHT: 148.6 LBS | HEIGHT: 63 IN | OXYGEN SATURATION: 99 % | BODY MASS INDEX: 26.33 KG/M2 | SYSTOLIC BLOOD PRESSURE: 118 MMHG

## 2019-02-21 DIAGNOSIS — E05.00 GRAVES' DISEASE: ICD-10-CM

## 2019-02-21 DIAGNOSIS — E04.2 MULTINODULAR GOITER: ICD-10-CM

## 2019-02-21 DIAGNOSIS — E55.9 VITAMIN D DEFICIENCY: ICD-10-CM

## 2019-02-21 DIAGNOSIS — E78.00 PURE HYPERCHOLESTEROLEMIA: ICD-10-CM

## 2019-02-21 DIAGNOSIS — E05.90 HYPERTHYROIDISM: ICD-10-CM

## 2019-02-21 PROCEDURE — G8417 CALC BMI ABV UP PARAM F/U: HCPCS | Performed by: INTERNAL MEDICINE

## 2019-02-21 PROCEDURE — G8427 DOCREV CUR MEDS BY ELIG CLIN: HCPCS | Performed by: INTERNAL MEDICINE

## 2019-02-21 PROCEDURE — G8484 FLU IMMUNIZE NO ADMIN: HCPCS | Performed by: INTERNAL MEDICINE

## 2019-02-21 PROCEDURE — 99203 OFFICE O/P NEW LOW 30 MIN: CPT | Performed by: INTERNAL MEDICINE

## 2019-02-21 PROCEDURE — 3017F COLORECTAL CA SCREEN DOC REV: CPT | Performed by: INTERNAL MEDICINE

## 2019-02-21 PROCEDURE — 1036F TOBACCO NON-USER: CPT | Performed by: INTERNAL MEDICINE

## 2019-02-21 RX ORDER — METHIMAZOLE 5 MG/1
2.5 TABLET ORAL DAILY
Qty: 30 TABLET | Refills: 5 | Status: SHIPPED | OUTPATIENT
Start: 2019-02-21 | End: 2019-03-12 | Stop reason: SDUPTHER

## 2019-02-21 RX ORDER — ERGOCALCIFEROL 1.25 MG/1
50000 CAPSULE ORAL WEEKLY
Qty: 12 CAPSULE | Refills: 2 | Status: SHIPPED | OUTPATIENT
Start: 2019-02-21 | End: 2019-09-12 | Stop reason: ALTCHOICE

## 2019-03-12 ENCOUNTER — OFFICE VISIT (OUTPATIENT)
Dept: ENDOCRINOLOGY | Age: 59
End: 2019-03-12
Payer: COMMERCIAL

## 2019-03-12 VITALS
WEIGHT: 157.2 LBS | DIASTOLIC BLOOD PRESSURE: 80 MMHG | BODY MASS INDEX: 27.85 KG/M2 | SYSTOLIC BLOOD PRESSURE: 132 MMHG | HEART RATE: 110 BPM | HEIGHT: 63 IN

## 2019-03-12 DIAGNOSIS — E05.90 HYPERTHYROIDISM: Primary | ICD-10-CM

## 2019-03-12 DIAGNOSIS — R73.01 IMPAIRED FASTING GLUCOSE: ICD-10-CM

## 2019-03-12 DIAGNOSIS — E05.00 GRAVES' DISEASE: ICD-10-CM

## 2019-03-12 DIAGNOSIS — E55.9 VITAMIN D DEFICIENCY: ICD-10-CM

## 2019-03-12 DIAGNOSIS — Z78.0 MENOPAUSE: ICD-10-CM

## 2019-03-12 DIAGNOSIS — E04.2 MULTINODULAR GOITER: ICD-10-CM

## 2019-03-12 DIAGNOSIS — R73.03 PREDIABETES: ICD-10-CM

## 2019-03-12 PROCEDURE — 1036F TOBACCO NON-USER: CPT | Performed by: INTERNAL MEDICINE

## 2019-03-12 PROCEDURE — G8417 CALC BMI ABV UP PARAM F/U: HCPCS | Performed by: INTERNAL MEDICINE

## 2019-03-12 PROCEDURE — 3017F COLORECTAL CA SCREEN DOC REV: CPT | Performed by: INTERNAL MEDICINE

## 2019-03-12 PROCEDURE — G8484 FLU IMMUNIZE NO ADMIN: HCPCS | Performed by: INTERNAL MEDICINE

## 2019-03-12 PROCEDURE — G8427 DOCREV CUR MEDS BY ELIG CLIN: HCPCS | Performed by: INTERNAL MEDICINE

## 2019-03-12 PROCEDURE — 99214 OFFICE O/P EST MOD 30 MIN: CPT | Performed by: INTERNAL MEDICINE

## 2019-03-12 RX ORDER — METHIMAZOLE 5 MG/1
2.5 TABLET ORAL DAILY
Qty: 30 TABLET | Refills: 3 | Status: SHIPPED | OUTPATIENT
Start: 2019-03-12 | End: 2020-03-11

## 2019-04-25 ENCOUNTER — TELEPHONE (OUTPATIENT)
Dept: INTERNAL MEDICINE CLINIC | Age: 59
End: 2019-04-25

## 2019-06-05 ENCOUNTER — OFFICE VISIT (OUTPATIENT)
Dept: FAMILY MEDICINE CLINIC | Age: 59
End: 2019-06-05
Payer: COMMERCIAL

## 2019-06-05 VITALS
DIASTOLIC BLOOD PRESSURE: 88 MMHG | OXYGEN SATURATION: 99 % | WEIGHT: 158.4 LBS | HEART RATE: 88 BPM | BODY MASS INDEX: 28.07 KG/M2 | SYSTOLIC BLOOD PRESSURE: 118 MMHG | HEIGHT: 63 IN

## 2019-06-05 DIAGNOSIS — E87.1 HYPONATREMIA: Primary | ICD-10-CM

## 2019-06-05 DIAGNOSIS — E05.90 HYPERTHYROIDISM: ICD-10-CM

## 2019-06-05 DIAGNOSIS — R73.03 PREDIABETES: ICD-10-CM

## 2019-06-05 PROCEDURE — 3017F COLORECTAL CA SCREEN DOC REV: CPT | Performed by: FAMILY MEDICINE

## 2019-06-05 PROCEDURE — 99214 OFFICE O/P EST MOD 30 MIN: CPT | Performed by: FAMILY MEDICINE

## 2019-06-05 PROCEDURE — G8417 CALC BMI ABV UP PARAM F/U: HCPCS | Performed by: FAMILY MEDICINE

## 2019-06-05 PROCEDURE — 1036F TOBACCO NON-USER: CPT | Performed by: FAMILY MEDICINE

## 2019-06-05 PROCEDURE — G8427 DOCREV CUR MEDS BY ELIG CLIN: HCPCS | Performed by: FAMILY MEDICINE

## 2019-06-05 ASSESSMENT — ENCOUNTER SYMPTOMS: RESPIRATORY NEGATIVE: 1

## 2019-06-05 NOTE — PROGRESS NOTES
Subjective:      Patient ID: Roslyn Sensor is a 61 y.o. female. HPI   Pt is a of 61 y.o. female comes in today with   Chief Complaint   Patient presents with    New Patient     Patient needs to establish with new PCP. Prediabetic. Watches diet. Last a1c was 5.9. Diet has been good. Sodium low on last blood test.  Told to increase salt and decrease water. Has not had this before    Concerned about thyroid. Excessive sweating  Compliant with tapazole    Past Medical History:Reviewed  Medications:Reviewed. Allergies   Allergen Reactions    Aleve [Naproxen Sodium]     Delsym Cough Relief [Dextromethorphan Hbr]      dizziness    Haldol [Haloperidol Lactate]     Morphine And Related     Penicillins     Prochlorperazine Edisylate     Reglan [Metoclopramide Hcl]     Sulfa Antibiotics       Social hx:Reviewed. Social History     Tobacco Use   Smoking Status Never Smoker   Smokeless Tobacco Never Used       Review of Systems   Constitutional: Negative for unexpected weight change. Respiratory: Negative. Cardiovascular: Negative. Objective:   Physical Exam  Vitals:    06/05/19 1339   BP: 118/88   Site: Left Upper Arm   Position: Sitting   Cuff Size: Medium Adult   Pulse: 88   SpO2: 99%   Weight: 158 lb 6.4 oz (71.8 kg)   Height: 5' 3\" (1.6 m)      Assessment:       Diagnosis Orders   1. Hyponatremia  BASIC METABOLIC PANEL    OSMOLALITY    OSMOLALITY, URINE    SODIUM, URINE, RANDOM   2. Hyperthyroidism  TSH without Reflex    T4, FREE   3. Prediabetes            Plan:      Lakia Hdez was seen today for new patient. Diagnoses and all orders for this visit:    Hyponatremia  -     BASIC METABOLIC PANEL; Future  -     OSMOLALITY; Future  -     OSMOLALITY, URINE; Future  -     SODIUM, URINE, RANDOM; Future   further tests to workup/confirm siadh vsother causes  Hyperthyroidism  -     TSH without Reflex; Future  -     T4, FREE;  Future  Recheck to see if stable on tapazole and encouraged to follow up with endo  Prediabetes  Has been stable with diet            Ann Marie Quintanilla MD

## 2019-06-06 ENCOUNTER — TELEPHONE (OUTPATIENT)
Dept: FAMILY MEDICINE CLINIC | Age: 59
End: 2019-06-06

## 2019-06-06 NOTE — TELEPHONE ENCOUNTER
Patient informed that PCP is out of the office this afternoon. Offered appointment with JM Hobbs and patient refused. Scheduled next available appointment, Wednesday June 12, per patient.

## 2019-06-06 NOTE — TELEPHONE ENCOUNTER
Pt's son called requesting the pt be seen today by Dr. Tierney Pino for back pain that started last night. He only wants her to see Dr. Tierney Pino. Please call and advise. Son hung up on the call center before I was able to speak to anyone. Please check pt's prior appt history and medical record.

## 2019-08-13 ENCOUNTER — TELEPHONE (OUTPATIENT)
Dept: ENDOCRINOLOGY | Age: 59
End: 2019-08-13

## 2019-08-13 NOTE — TELEPHONE ENCOUNTER
Pt's son called stating that blood work has not been done. Advised him that it needed to be done today for the follow up appointment.

## 2019-09-11 ENCOUNTER — TELEPHONE (OUTPATIENT)
Dept: PRIMARY CARE CLINIC | Age: 59
End: 2019-09-11

## 2019-09-11 NOTE — TELEPHONE ENCOUNTER
Pt is requesting to see Dr. David Flores only. She does not wish to see a different doctor or go to urgent care. She wants to be seen tomorrow. She saw Dr. Maridee Libman before. She says that she only saw him because she says Dr. David Flores was out of the [place]. She does not want to go back to him again. She has a lot of stomach cramping and pain for a week or more. She was told that Dr. David Flores did not have any openings until Friday. She is insisting on being seen tomorrow. Please advise.

## 2019-09-12 ENCOUNTER — OFFICE VISIT (OUTPATIENT)
Dept: PRIMARY CARE CLINIC | Age: 59
End: 2019-09-12
Payer: COMMERCIAL

## 2019-09-12 VITALS
BODY MASS INDEX: 28.53 KG/M2 | HEART RATE: 101 BPM | DIASTOLIC BLOOD PRESSURE: 86 MMHG | OXYGEN SATURATION: 100 % | WEIGHT: 161 LBS | HEIGHT: 63 IN | SYSTOLIC BLOOD PRESSURE: 138 MMHG

## 2019-09-12 DIAGNOSIS — R10.84 GENERALIZED ABDOMINAL PAIN: Primary | ICD-10-CM

## 2019-09-12 LAB
BILIRUBIN, POC: NORMAL
BLOOD URINE, POC: NORMAL
CLARITY, POC: CLEAR
COLOR, POC: YELLOW
GLUCOSE URINE, POC: NORMAL
KETONES, POC: NORMAL
LEUKOCYTE EST, POC: NORMAL
NITRITE, POC: NORMAL
PH, POC: 5.5
PROTEIN, POC: 30
SPECIFIC GRAVITY, POC: 1.03
UROBILINOGEN, POC: 0.2

## 2019-09-12 PROCEDURE — G8417 CALC BMI ABV UP PARAM F/U: HCPCS | Performed by: INTERNAL MEDICINE

## 2019-09-12 PROCEDURE — 81002 URINALYSIS NONAUTO W/O SCOPE: CPT | Performed by: INTERNAL MEDICINE

## 2019-09-12 PROCEDURE — 1036F TOBACCO NON-USER: CPT | Performed by: INTERNAL MEDICINE

## 2019-09-12 PROCEDURE — G8427 DOCREV CUR MEDS BY ELIG CLIN: HCPCS | Performed by: INTERNAL MEDICINE

## 2019-09-12 PROCEDURE — 99214 OFFICE O/P EST MOD 30 MIN: CPT | Performed by: INTERNAL MEDICINE

## 2019-09-12 PROCEDURE — 3017F COLORECTAL CA SCREEN DOC REV: CPT | Performed by: INTERNAL MEDICINE

## 2019-09-12 RX ORDER — DICYCLOMINE HCL 20 MG
20 TABLET ORAL EVERY 6 HOURS
Qty: 30 TABLET | Refills: 3 | Status: SHIPPED | OUTPATIENT
Start: 2019-09-12

## 2019-09-12 RX ORDER — SIMETHICONE 80 MG
80 TABLET,CHEWABLE ORAL EVERY 6 HOURS PRN
Qty: 30 TABLET | Refills: 3 | Status: SHIPPED | OUTPATIENT
Start: 2019-09-12

## 2020-06-25 ENCOUNTER — VIRTUAL VISIT (OUTPATIENT)
Dept: PRIMARY CARE CLINIC | Age: 60
End: 2020-06-25
Payer: COMMERCIAL

## 2020-06-25 PROCEDURE — 3017F COLORECTAL CA SCREEN DOC REV: CPT | Performed by: INTERNAL MEDICINE

## 2020-06-25 PROCEDURE — G8417 CALC BMI ABV UP PARAM F/U: HCPCS | Performed by: INTERNAL MEDICINE

## 2020-06-25 PROCEDURE — G8427 DOCREV CUR MEDS BY ELIG CLIN: HCPCS | Performed by: INTERNAL MEDICINE

## 2020-06-25 PROCEDURE — 99214 OFFICE O/P EST MOD 30 MIN: CPT | Performed by: INTERNAL MEDICINE

## 2020-06-25 PROCEDURE — 1036F TOBACCO NON-USER: CPT | Performed by: INTERNAL MEDICINE

## 2020-06-25 RX ORDER — METHYLPREDNISOLONE 4 MG/1
TABLET ORAL
Qty: 1 KIT | Refills: 0 | Status: SHIPPED | OUTPATIENT
Start: 2020-06-25

## 2020-06-25 RX ORDER — MELOXICAM 15 MG/1
15 TABLET ORAL DAILY PRN
Qty: 15 TABLET | Refills: 0 | Status: SHIPPED | OUTPATIENT
Start: 2020-06-25

## 2020-06-25 SDOH — ECONOMIC STABILITY: FOOD INSECURITY: WITHIN THE PAST 12 MONTHS, YOU WORRIED THAT YOUR FOOD WOULD RUN OUT BEFORE YOU GOT MONEY TO BUY MORE.: SOMETIMES TRUE

## 2020-06-25 SDOH — ECONOMIC STABILITY: FOOD INSECURITY: WITHIN THE PAST 12 MONTHS, THE FOOD YOU BOUGHT JUST DIDN'T LAST AND YOU DIDN'T HAVE MONEY TO GET MORE.: SOMETIMES TRUE

## 2020-06-25 SDOH — ECONOMIC STABILITY: INCOME INSECURITY: HOW HARD IS IT FOR YOU TO PAY FOR THE VERY BASICS LIKE FOOD, HOUSING, MEDICAL CARE, AND HEATING?: NOT VERY HARD

## 2020-06-25 SDOH — ECONOMIC STABILITY: TRANSPORTATION INSECURITY
IN THE PAST 12 MONTHS, HAS THE LACK OF TRANSPORTATION KEPT YOU FROM MEDICAL APPOINTMENTS OR FROM GETTING MEDICATIONS?: NO

## 2020-06-25 SDOH — ECONOMIC STABILITY: TRANSPORTATION INSECURITY
IN THE PAST 12 MONTHS, HAS LACK OF TRANSPORTATION KEPT YOU FROM MEETINGS, WORK, OR FROM GETTING THINGS NEEDED FOR DAILY LIVING?: NO

## 2020-06-25 NOTE — PROGRESS NOTES
2020    TELEHEALTH EVALUATION -- Audio/Visual (During [id number] public health emergency)    HPI:    Dipti Vieira (:  1960) has requested an audio/video evaluation for the following concern(s):    C/o chronic rt shoulder pain, for the last 2 months,   Severe pain in the rt shoulder worse at night, came on spontaneously 2 months ago,  No falls or injuries,   She cannot raise the rt shoulder for more than 45 degrees, hurts on the outer aspect of the rt shoulder,  No rash, no neck pain no co or sob, no fever or chills,  Tried tylenol and local heat,  Does not want to take meds,  Wants exercises,  Multinodular Goiter  On methimazole,  Patient is compliant w medications, no side effects, effective, provides adequate symptom relief. No new symptoms or problems as noted by patient. The problem is stable, no changes noted by patient. Will consider monitoring labs and refill medications as appropriate. Patient counseled and will continue current plan. Vitamin D Deficiency  On vit D . Patient is compliant w medications, no side effects, effective, provides adequate symptom relief. No new symptoms or problems as noted by patient. The problem is stable, no changes noted by patient. Will consider monitoring labs and refill medications as appropriate. Patient counseled and will continue current plan. Hyperthyroidism  Needs blood work and monitoring,   Has seen endo. Review of Systems  ROS: No unusual headaches or allergy symptoms or blurred vision. No prolonged cough. No flushing or facial pain or chest pain,dizziness, dyspnea, palpitations, or chest pain on exertion. No syncope. No nausea or vommitting or diarrhea. No jaundice or abdominal pain, change in bowel habits, black or bloody stools. No dysuria or hematuria or frequency of urination. No myalgias or muscle pain. No numbness, weakness, or tingling. No falls, or loss of consciousness. No weight loss or back pain. No falls. No paresthesias. No joint swelling or redness. No joint pain. No recent weight loss. No focal weakness or sensory deficits or paresthesias, No confusion or altered sensorium. No hematemesis. No hearing loss. No siezures. All other systems were reviewed, and review was negative. Prior to Visit Medications    Medication Sig Taking? Authorizing Provider   dicyclomine (BENTYL) 20 MG tablet Take 1 tablet by mouth every 6 hours  Patient not taking: Reported on 6/25/2020  Jael Lujan MD   simethicone (MYLICON) 80 MG chewable tablet Take 1 tablet by mouth every 6 hours as needed for Flatulence  Patient not taking: Reported on 6/25/2020  Jael Lujan MD       Social History     Tobacco Use    Smoking status: Never Smoker    Smokeless tobacco: Never Used   Substance Use Topics    Alcohol use: No    Drug use: No        Allergies   Allergen Reactions    Aleve [Naproxen Sodium]     Delsym Cough Relief [Dextromethorphan Hbr]      dizziness    Haldol [Haloperidol Lactate]     Morphine And Related     Penicillins     Prochlorperazine Edisylate     Reglan [Metoclopramide Hcl]     Sulfa Antibiotics    ,   Past Medical History:   Diagnosis Date    Acne vulgaris     Anxiety 7/25/2014    Degenerative arthritis of thoracic spine 6/2010    Depression 1/8/2015    Dizziness     Under care of neurologist(Dr. Chasity Pizano)    Fibromyalgia     GERD (gastroesophageal reflux disease) 2008    Goiter     Headache(784.0)     Under care of neurologist(Dr. Chasity Pizano)    HTN (hypertension)     clonidine per endo:Dr. Danielle House Hypercholesterolemia     under care of endo    Hyperthyroidism     Under care endo(Dr. Sushila Galloway)    Meniere's disease 1995    Under care of ENT(initially Dr Verito Navarro. Current ENT=Dr.Umesh Schaffer:tel [id number]. see h&p 8/20/2009:updated 8/20/2009    Mitral regurgitation     As per echo 15yrs ago: records pending:updated 8/20/2009.  Nuclear stress test 5/20/10=nml as per Dr. Augusto Jewell)    Pap smear Visit was conducted with patient's (and/or legal guardian's) consent, to reduce the patient's risk of exposure to COVID-19 and provide necessary medical care. The patient (and/or legal guardian) has also been advised to contact this office for worsening conditions or problems, and seek emergency medical treatment and/or call 911 if deemed necessary. Patient identification was verified at the start of the visit: Yes    Total time spent on this encounter: Not billed by time    Services were provided through a video synchronous discussion virtually to substitute for in-person clinic visit. Patient and provider were located at their individual homes. --Gaby Cleveland MD on 6/25/2020 at 4:12 PM    An electronic signature was used to authenticate this note.

## 2020-06-25 NOTE — PATIENT INSTRUCTIONS
your other hand, hold your injured arm (palm outward) behind your back by the wrist. Pull your arm up gently to stretch your shoulder. 3. Advanced stretch: Put a towel over your other shoulder. Put the hand of your injured arm behind your back. Now hold the back end of the towel. With the other hand, hold the front end of the towel in front of your body. Pull gently on the front end of the towel. This will bring your hand farther up your back to stretch your shoulder. Overhead stretch   1. Standing about an arm's length away, grasp onto a solid surface. You could use a countertop, a doorknob, or the back of a sturdy chair. 2. With your knees slightly bent, bend forward with your arms straight. Lower your upper body, and let your shoulders stretch. 3. As your shoulders are able to stretch farther, you may need to take a step or two backward. 4. Hold for at least 15 to 30 seconds. Then stand up and relax. If you had stepped back during your stretch, step forward so you can keep your hands on the solid surface. 5. Repeat 2 to 4 times. Shoulder flexion (lying down)   To make a wand for this exercise, use a piece of PVC pipe or a broom handle with the broom removed. Make the wand about a foot wider than your shoulders. 1. Lie on your back, holding a wand with both hands. Your palms should face down as you hold the wand. 2. Keeping your elbows straight, slowly raise your arms over your head. Raise them until you feel a stretch in your shoulders, upper back, and chest.  3. Hold for 15 to 30 seconds. 4. Repeat 2 to 4 times. Shoulder rotation (lying down)   To make a wand for this exercise, use a piece of PVC pipe or a broom handle with the broom removed. Make the wand about a foot wider than your shoulders. 1. Lie on your back. Hold a wand with both hands with your elbows bent and palms up. 2. Keep your elbows close to your body, and move the wand across your body toward the sore arm.   3. Hold for 8 to 12 seconds. 4. Repeat 2 to 4 times. Wall climbing (to the side)   Avoid any movement that is straight to your side, and be careful not to arch your back. Your arm should stay about 30 degrees to the front of your side. 1. Stand with your side to a wall so that your fingers can just touch it at an angle about 30 degrees toward the front of your body. 2. Walk the fingers of your injured arm up the wall as high as pain permits. Try not to shrug your shoulder up toward your ear as you move your arm up. 3. Hold that position for a count of at least 15 to 20.  4. Walk your fingers back down to the starting position. 5. Repeat at least 2 to 4 times. Try to reach higher each time. Wall climbing (to the front)   During this stretching exercise, be careful not to arch your back. 1. Face a wall, and stand so your fingers can just touch it. 2. Keeping your shoulder down, walk the fingers of your injured arm up the wall as high as pain permits. (Don't shrug your shoulder up toward your ear.)  3. Hold your arm in that position for at least 15 to 30 seconds. 4. Slowly walk your fingers back down to where you started. 5. Repeat at least 2 to 4 times. Try to reach higher each time. Shoulder blade squeeze   1. Stand with your arms at your sides, and squeeze your shoulder blades together. Do not raise your shoulders up as you squeeze. 2. Hold 6 seconds. 3. Repeat 8 to 12 times. Scapular exercise: Arm reach   1. Lie flat on your back. This exercise is a very slight motion that starts with your arms raised (elbows straight, arms straight). 2. From this position, reach higher toward the keo or ceiling. Keep your elbows straight. All motion should be from your shoulder blade only. 3. Relax your arms back to where you started. 4. Repeat 8 to 12 times. Arm raise to the side   During this strengthening exercise, your arm should stay about 30 degrees to the front of your side.   1. Slowly raise your injured arm to the [place]

## 2020-06-29 ENCOUNTER — TELEPHONE (OUTPATIENT)
Dept: PRIMARY CARE CLINIC | Age: 60
End: 2020-06-29

## 2020-08-27 ENCOUNTER — TELEPHONE (OUTPATIENT)
Dept: PRIMARY CARE CLINIC | Age: 60
End: 2020-08-27

## 2020-08-27 NOTE — TELEPHONE ENCOUNTER
----- Message from Robert Vogel sent at 8/26/2020  3:58 PM EDT -----  Subject: Referral Request    QUESTIONS   Reason for referral request? 2nd opinion on hyperthyroid condition   Has the physician seen you for this condition before? No   Preferred Specialist (if applicable)? Do you already have an appointment scheduled? No  Additional Information for Provider? [facility] . Jt [place]rade .fax #   [phone] phn# [phone]. Jt Andrade pt also is requesting if dr can indicate   on referral that pt prefers vv w/ endo  ---------------------------------------------------------------------------  --------------  CALL BACK INFO  What is the best way for the office to contact you? OK to leave message on   voicemail  Preferred Call Back Phone Number?  [phone]

## 2020-10-16 ENCOUNTER — VIRTUAL VISIT (OUTPATIENT)
Dept: PRIMARY CARE CLINIC | Age: 60
End: 2020-10-16
Payer: COMMERCIAL

## 2020-10-16 PROBLEM — F51.01 PRIMARY INSOMNIA: Chronic | Status: ACTIVE | Noted: 2020-10-16

## 2020-10-16 PROCEDURE — G8421 BMI NOT CALCULATED: HCPCS | Performed by: INTERNAL MEDICINE

## 2020-10-16 PROCEDURE — G8484 FLU IMMUNIZE NO ADMIN: HCPCS | Performed by: INTERNAL MEDICINE

## 2020-10-16 PROCEDURE — 99214 OFFICE O/P EST MOD 30 MIN: CPT | Performed by: INTERNAL MEDICINE

## 2020-10-16 PROCEDURE — 1036F TOBACCO NON-USER: CPT | Performed by: INTERNAL MEDICINE

## 2020-10-16 PROCEDURE — G8427 DOCREV CUR MEDS BY ELIG CLIN: HCPCS | Performed by: INTERNAL MEDICINE

## 2020-10-16 PROCEDURE — 3017F COLORECTAL CA SCREEN DOC REV: CPT | Performed by: INTERNAL MEDICINE

## 2020-10-16 RX ORDER — TRAZODONE HYDROCHLORIDE 100 MG/1
100 TABLET ORAL NIGHTLY
Qty: 90 TABLET | Refills: 1 | Status: SHIPPED | OUTPATIENT
Start: 2020-10-16

## 2020-10-16 NOTE — ASSESSMENT & PLAN NOTE
Has trouble sleeping,   Cannot initiate sleep, has trouble staying asleep.   Has tried trazodone in the past,  Will try trazodone 100 mg po qhs,

## 2020-10-16 NOTE — PROGRESS NOTES
10/16/2020    TELEHEALTH EVALUATION -- Audio/Visual (During UDGKB-80 public health emergency)    HPI:    Kwame Manley (:  1960) has requested an audio/video evaluation for the following concern(s):    Established patient here for a visit to manage acute and chronic medical conditions as detailed below. Primary insomnia  Has trouble sleeping,   Cannot initiate sleep, has trouble staying asleep. Has tried trazodone in the past,  Will try trazodone 100 mg po qhs,       Multinodular Goiter  Needs blood work,  On tapazole,  Patient is compliant w medications, no side effects, effective, provides adequate symptom relief. No new symptoms or problems as noted by patient. The problem is stable, no changes noted by patient. Will consider monitoring labs and refill medications as appropriate. Patient counseled and will continue current plan. Mixed hyperlipidemia  Will need to check blood work,       Review of Systems  ROS: No unusual headaches or allergy symptoms or blurred vision. No prolonged cough. No flushing or facial pain or chest pain,dizziness, dyspnea, palpitations, or chest pain on exertion. No syncope. No nausea or vommitting or diarrhea. No jaundice or abdominal pain, change in bowel habits, black or bloody stools. No dysuria or hematuria or frequency of urination. No myalgias or muscle pain. No numbness, weakness, or tingling. No falls, or loss of consciousness. No weight loss or back pain. No falls. No paresthesias. No joint swelling or redness. No joint pain. No recent weight loss. No focal weakness or sensory deficits or paresthesias, No confusion or altered sensorium. No hematemesis. No hearing loss. No siezures. All other systems were reviewed, and review was negative. Prior to Visit Medications    Medication Sig Taking?  Authorizing Provider   traZODone (DESYREL) 100 MG tablet Take 1 tablet by mouth nightly Yes Ramy Boyd MD   methylPREDNISolone (MEDROL DOSEPACK) 4 MG tablet 10/8/13    Screening mammogram     Declining testing:will notify Dr. Sisi Wooten when desired:updated 5/24/11;10/8/13    subacromial/subdeltoid bursitis:left shoulder 11/12/2013    Syncope and collapse     Tendinosis:Left rotator cuff 11/12/2013    Uterine leiomyoma - fibroids     Required 2 units blood transfusion secondary to Hgb. 7.0. No surgery b/c pat. declined in 2003. Asx & no blood loss since 2003.  Vitamin D deficiency     As per endo   ,   Social History     Tobacco Use    Smoking status: Never Smoker    Smokeless tobacco: Never Used   Substance Use Topics    Alcohol use: No    Drug use: No       PHYSICAL EXAMINATION:  [ INSTRUCTIONS:  \"[x]\" Indicates a positive item  \"[]\" Indicates a negative item  -- DELETE ALL ITEMS NOT EXAMINED]  Vital Signs: (As obtained by patient/caregiver or practitioner observation)    Blood pressure- 132/87 Heart rate- 72   Respiratory rate-14    Temperature- normal per patient Pulse oximetry-     Constitutional: [x] Appears well-developed and well-nourished [x] No apparent distress      [] Abnormal-   Mental status  [x] Alert and awake  [x] Oriented to person/place/time [x]Able to follow commands      Eyes:  EOM    [x]  Normal  [] Abnormal-  Sclera  [x]  Normal  [] Abnormal -         Discharge []  None visible  [] Abnormal -    HENT:   [x] Normocephalic, atraumatic.   [] Abnormal   [x] Mouth/Throat: Mucous membranes are moist.     External Ears [x] Normal  [] Abnormal-     Neck: [x] No visualized mass     Pulmonary/Chest: [x] Respiratory effort normal.  [x] No visualized signs of difficulty breathing or respiratory distress        [] Abnormal-      Musculoskeletal:   [x] Normal gait with no signs of ataxia         [x] Normal range of motion of neck        [] Abnormal-       Neurological:        [x] No Facial Asymmetry (Cranial nerve 7 motor function) (limited exam to video visit)          [x] No gaze palsy        [] Abnormal-         Skin:        [x] No significant exanthematous lesions or discoloration noted on facial skin         [] Abnormal-            Psychiatric:       [x] Normal Affect [x] No Hallucinations        [] Abnormal-     Other pertinent observable physical exam findings-     ASSESSMENT/PLAN:  Primary insomnia  Has trouble sleeping,   Cannot initiate sleep, has trouble staying asleep. Has tried trazodone in the past,  Will try trazodone 100 mg po qhs,       Multinodular Goiter  Needs blood work,  On tapazole,  Patient is compliant w medications, no side effects, effective, provides adequate symptom relief. No new symptoms or problems as noted by patient. The problem is stable, no changes noted by patient. Will consider monitoring labs and refill medications as appropriate. Patient counseled and will continue current plan. Mixed hyperlipidemia  Will need to check blood work,       No follow-ups on file. Neo Hawk is a 61 y.o. female being evaluated by a Virtual Visit (video visit) encounter to address concerns as mentioned above. A caregiver was present when appropriate. Due to this being a TeleHealth encounter (During DLZGZ-68 public health emergency), evaluation of the following organ systems was limited: Vitals/Constitutional/EENT/Resp/CV/GI//MS/Neuro/Skin/Heme-Lymph-Imm. Pursuant to the emergency declaration under the [address] [place] [address], [address] [place] [address] authority and the [employer] Act, this Virtual Visit was conducted with patient's (and/or legal guardian's) consent, to reduce the patient's risk of exposure to COVID-19 and provide necessary medical care. The patient (and/or legal guardian) has also been advised to contact this office for worsening conditions or problems, and seek emergency medical treatment and/or call 911 if deemed necessary.      Patient identification was verified at the start of the visit: Yes    Total time spent on this encounter: Not billed by time    Services were provided through a video synchronous discussion virtually to substitute for in-person clinic visit. Patient and provider were located at their individual homes. --Freddie Jackson MD on 10/16/2020 at 4:00 PM    An electronic signature was used to authenticate this note.

## 2020-10-16 NOTE — ASSESSMENT & PLAN NOTE
Needs blood work,  On tapazole,  Patient is compliant w medications, no side effects, effective, provides adequate symptom relief. No new symptoms or problems as noted by patient. The problem is stable, no changes noted by patient. Will consider monitoring labs and refill medications as appropriate. Patient counseled and will continue current plan.

## 2021-01-14 ENCOUNTER — TELEPHONE (OUTPATIENT)
Dept: PRIMARY CARE CLINIC | Age: 61
End: 2021-01-14

## 2021-01-14 NOTE — TELEPHONE ENCOUNTER
----- Message from Sabrina Mistry sent at 1/13/2021 11:53 AM EST -----  Subject: Appointment Request    Reason for Call: Routine (Patient Request) No Script    QUESTIONS  Type of Appointment? Established Patient  Reason for appointment request? No appointments available during search  Additional Information for Provider? Patient started yesterday with runny   nose and sneezing. Would like to have a telephone or video appt today or   tomorrow. Please call patient asap.   ---------------------------------------------------------------------------  --------------  CALL BACK INFO  What is the best way for the office to contact you? OK to leave message on   voicemail  Preferred Call Back Phone Number? [phone]  ---------------------------------------------------------------------------  --------------  SCRIPT ANSWERS  Relationship to Patient? Other  Representative Name? Ignacio Nor  Additional information verified (besides Name and Date of Birth)? Address  Appointment reason? Symptomatic  Select script based on patient symptoms? Adult No Script  (Is the patient requesting to see the provider for a procedure?)? No  (Is the patient requesting to see the provider urgently  today or   tomorrow. )? No  Have you been diagnosed with   tested for   or told that you are suspected of having COVID-19 (Coronavirus)? No  Have you had a fever or taken medication to treat a fever within the past   3 days? No  Have you had a cough   shortness of breath or flu-like symptoms within the past 3 days?  Yes

## 2021-01-15 ENCOUNTER — VIRTUAL VISIT (OUTPATIENT)
Dept: PRIMARY CARE CLINIC | Age: 61
End: 2021-01-15
Payer: COMMERCIAL

## 2021-01-15 DIAGNOSIS — J01.00 ACUTE MAXILLARY SINUSITIS, RECURRENCE NOT SPECIFIED: Primary | ICD-10-CM

## 2021-01-15 PROCEDURE — 1036F TOBACCO NON-USER: CPT | Performed by: INTERNAL MEDICINE

## 2021-01-15 PROCEDURE — 3017F COLORECTAL CA SCREEN DOC REV: CPT | Performed by: INTERNAL MEDICINE

## 2021-01-15 PROCEDURE — G8421 BMI NOT CALCULATED: HCPCS | Performed by: INTERNAL MEDICINE

## 2021-01-15 PROCEDURE — G8484 FLU IMMUNIZE NO ADMIN: HCPCS | Performed by: INTERNAL MEDICINE

## 2021-01-15 PROCEDURE — 99213 OFFICE O/P EST LOW 20 MIN: CPT | Performed by: INTERNAL MEDICINE

## 2021-01-15 PROCEDURE — G8428 CUR MEDS NOT DOCUMENT: HCPCS | Performed by: INTERNAL MEDICINE

## 2021-01-15 NOTE — PROGRESS NOTES
1/15/2021    TELEHEALTH EVALUATION -- Audio/Visual (During HNKKZ-73 public health emergency)    HPI:    Mireya Cardona (:  1960) has requested an audio/video evaluation for the following concern(s):    Subjective  Patient complains of  Sinus congestion, pressure and cough for 3-5 days. Also complains of pain and discomfort in both the ears, decreased hearing. Some hoarseness, Cough is productive with phlegm production and is colored. Some fever and chills. Also has body aches and fatigue. Feels very weak. Symptoms are getting worse. Denies shortness of breath or wheezing. Has had sore throat as well. Tried otc decongestants but did not help. Review of Systems  All the review of systems negative except above   Prior to Visit Medications    Medication Sig Taking? Authorizing Provider   traZODone (DESYREL) 100 MG tablet Take 1 tablet by mouth nightly Yes Vicky Hirsch MD   methylPREDNISolone (MEDROL DOSEPACK) 4 MG tablet Take by mouth.   Patient not taking: Reported on 1/15/2021  Vicky Hirsch MD   meloxicam (MOBIC) 15 MG tablet Take 1 tablet by mouth daily as needed for Pain  Patient not taking: Reported on 10/16/2020  Vicky Hirsch MD   dicyclomine (BENTYL) 20 MG tablet Take 1 tablet by mouth every 6 hours  Patient not taking: Reported on 2020  Vicky Hirsch MD   simethicone (MYLICON) 80 MG chewable tablet Take 1 tablet by mouth every 6 hours as needed for Flatulence  Patient not taking: Reported on 2020  Vicky Hirsch MD       Social History     Tobacco Use    Smoking status: Never Smoker    Smokeless tobacco: Never Used   Substance Use Topics    Alcohol use: No    Drug use: No        Allergies   Allergen Reactions    Aleve [Naproxen Sodium]     Delsym Cough Relief [Dextromethorphan Hbr]      dizziness    Haldol [Haloperidol Lactate]     Morphine And Related     Penicillins     Prochlorperazine Edisylate     Reglan [Metoclopramide Hcl]     Sulfa Antibiotics ,   Past Medical History:   Diagnosis Date    Acne vulgaris     Anxiety 7/25/2014    Degenerative arthritis of thoracic spine 6/2010    Depression 1/8/2015    Dizziness     Under care of neurologist(Dr. Jessica Mejía)    Fibromyalgia     GERD (gastroesophageal reflux disease) 2008    Goiter     Headache(784.0)     Under care of neurologist(Dr. Jessica Mejía)    HTN (hypertension)     clonidine per endo:Dr. Alonzo Novak Hypercholesterolemia     under care of endo    Hyperthyroidism     Under care endo(Dr. Keyanna Reich)    Meniere's disease 1995    Under care of ENT(initially Dr Kristian Rea. Current ENT=Dr.Umesh Schaffer:tel [phone]. see h&p 8/20/2009:updated 8/20/2009    Mitral regurgitation     As per echo 15yrs ago: records pending:updated 8/20/2009. Nuclear stress test 5/20/10=nml as per Dr. Sissy Hebert)    Pap smear     2008:[facility] Dr. Davis Thapa.  Screening colonoscopy     Declining testing:will notify Dr. Cristina Posada when desired:updated 5/24/11;update 10/8/13    Screening mammogram     Declining testing:will notify Dr. Cristina Posada when desired:updated 5/24/11;10/8/13    subacromial/subdeltoid bursitis:left shoulder 11/12/2013    Syncope and collapse     Tendinosis:Left rotator cuff 11/12/2013    Uterine leiomyoma - fibroids     Required 2 units blood transfusion secondary to Hgb. 7.0. No surgery b/c pat. declined in 2003. Asx & no blood loss since 2003.     Vitamin D deficiency     As per endo   ,   Social History     Tobacco Use    Smoking status: Never Smoker    Smokeless tobacco: Never Used   Substance Use Topics    Alcohol use: No    Drug use: No       PHYSICAL EXAMINATION:  [ INSTRUCTIONS:  \"[x]\" Indicates a positive item  \"[]\" Indicates a negative item  -- DELETE ALL ITEMS NOT EXAMINED]  Vital Signs: (As obtained by patient/caregiver or practitioner observation)    Blood pressure- 132/82 Heart rate-76    Respiratory rate-12    Temperature- 98.6 Pulse oximetry- Constitutional: [x] Appears well-developed and well-nourished [x] No apparent distress      [] Abnormal-   Mental status  [x] Alert and awake  [x] Oriented to person/place/time []Able to follow commands      Eyes:  EOM    [x]  Normal  [] Abnormal-  Sclera  [x]  Normal  [] Abnormal -         Discharge [x]  None visible  [] Abnormal -    HENT:   [x] Normocephalic, atraumatic. [] Abnormal   [x] Mouth/Throat: Mucous membranes are moist.     External Ears [x] Normal  [] Abnormal-     Neck: [x] No visualized mass     Pulmonary/Chest: [x] Respiratory effort normal.  [x] No visualized signs of difficulty breathing or respiratory distress        [] Abnormal-      Musculoskeletal:   [x] Normal gait with no signs of ataxia         [x] Normal range of motion of neck        [] Abnormal-       Neurological:        [x] No Facial Asymmetry (Cranial nerve 7 motor function) (limited exam to video visit)          [x] No gaze palsy        [] Abnormal-         Skin:        [x] No significant exanthematous lesions or discoloration noted on facial skin         [] Abnormal-            Psychiatric:       [x] Normal Affect [x] No Hallucinations        [] Abnormal-     Other pertinent observable physical exam findings-     ASSESSMENT/PLAN:  Assessment  Acute sinusitis  Plan  Start antibiotics and over-the counter decongestants. Consume a lot of fluids, rest and call if no better in 5 days, or if new symptoms develop.

## 2021-06-16 ENCOUNTER — TELEPHONE (OUTPATIENT)
Dept: PRIMARY CARE CLINIC | Age: 61
End: 2021-06-16

## 2021-06-16 NOTE — TELEPHONE ENCOUNTER
Pt has not been in the office since 2019   Pt last 3 Ov were all vv   Pt is having constipation and abdominal pain  I informed Patient she needs to come in to office so we can feel around her abdomen.  Pt refuse

## 2021-06-16 NOTE — TELEPHONE ENCOUNTER
----- Message from Renée Garcia sent at 6/16/2021 11:24 AM EDT -----  Subject: Appointment Request    Reason for Call: Routine Existing Condition Follow Up    QUESTIONS  Type of Appointment? Established Patient  Reason for appointment request? No appointments available during search  Additional Information for Provider? Pt son called to schedule a VV. No VV   available but plenty of in-person visits. For 6/17/21 or 6/18/21  ---------------------------------------------------------------------------  --------------  CALL BACK INFO  What is the best way for the office to contact you? OK to leave message on   voicemail  Preferred Call Back Phone Number? [phone]  ---------------------------------------------------------------------------  --------------  SCRIPT ANSWERS  Relationship to Patient? Other  Representative Name? [facility]  Additional information verified (besides Name and Date of Birth)? Phone   Number  Appointment reason? Well Care/Follow Ups  Select a Well Care/Follow Ups appointment reason? Adult Existing Condition   Follow Up [Diabetes, CHF, COPD, Hypertension/Blood Pressure Check]  (Is the patient requesting to be seen urgently for their symptoms?)? No  Is this follow up request related to routine Diabetes Management? No  Are you having any new concerns about your existing condition? No  Have you been diagnosed with, awaiting test results for, or told that you   are suspected of having COVID-19 (Coronavirus)? (If patient has tested   negative or was tested as a requirement for work, school, or travel and   not based on symptoms, answer no)? No  Do you currently have flu-like symptoms including fever or chills, cough,   shortness of breath, difficulty breathing, or new loss of taste or smell? No  Have you had close contact with someone with COVID-19 in the last 14 days? No  (Service Expert  click yes below to proceed with [employer] As Usual   Scheduling)?  Yes

## 2023-03-06 ENCOUNTER — HOSPITAL ENCOUNTER (OUTPATIENT)
Dept: ULTRASOUND IMAGING | Age: 63
Discharge: HOME | End: 2023-03-06
Payer: COMMERCIAL

## 2023-03-06 DIAGNOSIS — Z86.39 HISTORY OF HYPERTHYROIDISM: ICD-10-CM

## 2023-03-06 PROCEDURE — 76536 US EXAM OF HEAD AND NECK: CPT

## 2023-04-24 ENCOUNTER — OFFICE VISIT (OUTPATIENT)
Dept: PRIMARY CARE CLINIC | Age: 63
End: 2023-04-24
Payer: COMMERCIAL

## 2023-04-24 VITALS
WEIGHT: 171 LBS | TEMPERATURE: 97.6 F | SYSTOLIC BLOOD PRESSURE: 140 MMHG | RESPIRATION RATE: 14 BRPM | OXYGEN SATURATION: 99 % | DIASTOLIC BLOOD PRESSURE: 98 MMHG | BODY MASS INDEX: 30.29 KG/M2 | HEART RATE: 122 BPM

## 2023-04-24 DIAGNOSIS — L60.8 NAIL DISCOLORATION: ICD-10-CM

## 2023-04-24 DIAGNOSIS — M54.41 ACUTE BILATERAL LOW BACK PAIN WITH BILATERAL SCIATICA: Primary | ICD-10-CM

## 2023-04-24 DIAGNOSIS — M54.42 ACUTE BILATERAL LOW BACK PAIN WITH BILATERAL SCIATICA: Primary | ICD-10-CM

## 2023-04-24 DIAGNOSIS — F51.01 PRIMARY INSOMNIA: Chronic | ICD-10-CM

## 2023-04-24 PROCEDURE — G8419 CALC BMI OUT NRM PARAM NOF/U: HCPCS | Performed by: INTERNAL MEDICINE

## 2023-04-24 PROCEDURE — G8427 DOCREV CUR MEDS BY ELIG CLIN: HCPCS | Performed by: INTERNAL MEDICINE

## 2023-04-24 PROCEDURE — 99214 OFFICE O/P EST MOD 30 MIN: CPT | Performed by: INTERNAL MEDICINE

## 2023-04-24 PROCEDURE — 3017F COLORECTAL CA SCREEN DOC REV: CPT | Performed by: INTERNAL MEDICINE

## 2023-04-24 PROCEDURE — 1036F TOBACCO NON-USER: CPT | Performed by: INTERNAL MEDICINE

## 2023-04-24 RX ORDER — METHYLPREDNISOLONE 4 MG/1
TABLET ORAL
Qty: 1 KIT | Refills: 0 | Status: SHIPPED | OUTPATIENT
Start: 2023-04-24

## 2023-04-24 RX ORDER — DICLOFENAC SODIUM 75 MG/1
75 TABLET, DELAYED RELEASE ORAL 2 TIMES DAILY
Qty: 20 TABLET | Refills: 3 | Status: SHIPPED | OUTPATIENT
Start: 2023-04-24

## 2023-04-24 RX ORDER — TRAZODONE HYDROCHLORIDE 100 MG/1
100 TABLET ORAL NIGHTLY
Qty: 30 TABLET | Refills: 2 | Status: SHIPPED | OUTPATIENT
Start: 2023-04-24

## 2023-04-24 SDOH — ECONOMIC STABILITY: FOOD INSECURITY: WITHIN THE PAST 12 MONTHS, YOU WORRIED THAT YOUR FOOD WOULD RUN OUT BEFORE YOU GOT MONEY TO BUY MORE.: NEVER TRUE

## 2023-04-24 SDOH — ECONOMIC STABILITY: INCOME INSECURITY: HOW HARD IS IT FOR YOU TO PAY FOR THE VERY BASICS LIKE FOOD, HOUSING, MEDICAL CARE, AND HEATING?: NOT HARD AT ALL

## 2023-04-24 SDOH — ECONOMIC STABILITY: HOUSING INSECURITY
IN THE LAST 12 MONTHS, WAS THERE A TIME WHEN YOU DID NOT HAVE A STEADY PLACE TO SLEEP OR SLEPT IN A SHELTER (INCLUDING NOW)?: NO

## 2023-04-24 SDOH — ECONOMIC STABILITY: FOOD INSECURITY: WITHIN THE PAST 12 MONTHS, THE FOOD YOU BOUGHT JUST DIDN'T LAST AND YOU DIDN'T HAVE MONEY TO GET MORE.: NEVER TRUE

## 2023-04-24 ASSESSMENT — PATIENT HEALTH QUESTIONNAIRE - PHQ9
5. POOR APPETITE OR OVEREATING: 0
SUM OF ALL RESPONSES TO PHQ9 QUESTIONS 1 & 2: 2
7. TROUBLE CONCENTRATING ON THINGS, SUCH AS READING THE NEWSPAPER OR WATCHING TELEVISION: 0
SUM OF ALL RESPONSES TO PHQ QUESTIONS 1-9: 2
9. THOUGHTS THAT YOU WOULD BE BETTER OFF DEAD, OR OF HURTING YOURSELF: 0
8. MOVING OR SPEAKING SO SLOWLY THAT OTHER PEOPLE COULD HAVE NOTICED. OR THE OPPOSITE, BEING SO FIGETY OR RESTLESS THAT YOU HAVE BEEN MOVING AROUND A LOT MORE THAN USUAL: 0
SUM OF ALL RESPONSES TO PHQ QUESTIONS 1-9: 2
SUM OF ALL RESPONSES TO PHQ QUESTIONS 1-9: 2
2. FEELING DOWN, DEPRESSED OR HOPELESS: 1
10. IF YOU CHECKED OFF ANY PROBLEMS, HOW DIFFICULT HAVE THESE PROBLEMS MADE IT FOR YOU TO DO YOUR WORK, TAKE CARE OF THINGS AT HOME, OR GET ALONG WITH OTHER PEOPLE: 0
4. FEELING TIRED OR HAVING LITTLE ENERGY: 0
1. LITTLE INTEREST OR PLEASURE IN DOING THINGS: 1
SUM OF ALL RESPONSES TO PHQ QUESTIONS 1-9: 2
6. FEELING BAD ABOUT YOURSELF - OR THAT YOU ARE A FAILURE OR HAVE LET YOURSELF OR YOUR FAMILY DOWN: 0
3. TROUBLE FALLING OR STAYING ASLEEP: 0

## 2023-04-24 NOTE — ASSESSMENT & PLAN NOTE
On trazodone  Patient is compliant w medications, no side effects, effective, provides adequate symptom relief. No new symptoms or problems as noted by patient. The problem is stable, no changes noted by patient. Will consider monitoring labs and refill medications as appropriate. Patient counseled and will continue current plan.

## 2023-04-24 NOTE — PROGRESS NOTES
no changes noted by patient. Will consider monitoring labs and refill medications as appropriate. Patient counseled and will continue current plan. Plan  Explained to the patient the possible causes of lumbar radiculopathy, including sciatica and disc herniations. Conservative treatment is recommended initially. Back stretching was reviewed with the patient, and will be performed twice daily. Moist heat locally. Back protective techniques reviewed, along with sleep positioning. Start antiinflammatories, muscle relaxants and pain medications. Consider Physical Therapy and XRay studies or even MRI if not improving. Call or return to clinic prn if these symptoms worsen or fail to improve as anticipated.

## 2023-05-08 ENCOUNTER — OFFICE VISIT (OUTPATIENT)
Dept: PRIMARY CARE CLINIC | Age: 63
End: 2023-05-08
Payer: COMMERCIAL

## 2023-05-08 VITALS
BODY MASS INDEX: 30.47 KG/M2 | RESPIRATION RATE: 13 BRPM | SYSTOLIC BLOOD PRESSURE: 120 MMHG | WEIGHT: 172 LBS | DIASTOLIC BLOOD PRESSURE: 80 MMHG | TEMPERATURE: 97.6 F | OXYGEN SATURATION: 99 %

## 2023-05-08 DIAGNOSIS — R10.84 GENERALIZED ABDOMINAL PAIN: Primary | ICD-10-CM

## 2023-05-08 LAB
BILIRUBIN, POC: NORMAL
BLOOD URINE, POC: NORMAL
CLARITY, POC: CLEAR
COLOR, POC: NORMAL
GLUCOSE URINE, POC: NORMAL
KETONES, POC: NORMAL
LEUKOCYTE EST, POC: NORMAL
NITRITE, POC: NORMAL
PH, POC: 7
PROTEIN, POC: NORMAL
SPECIFIC GRAVITY, POC: 1.03
UROBILINOGEN, POC: 0.2

## 2023-05-08 PROCEDURE — G8419 CALC BMI OUT NRM PARAM NOF/U: HCPCS | Performed by: INTERNAL MEDICINE

## 2023-05-08 PROCEDURE — 1036F TOBACCO NON-USER: CPT | Performed by: INTERNAL MEDICINE

## 2023-05-08 PROCEDURE — 99213 OFFICE O/P EST LOW 20 MIN: CPT | Performed by: INTERNAL MEDICINE

## 2023-05-08 PROCEDURE — 81002 URINALYSIS NONAUTO W/O SCOPE: CPT | Performed by: INTERNAL MEDICINE

## 2023-05-08 PROCEDURE — G8427 DOCREV CUR MEDS BY ELIG CLIN: HCPCS | Performed by: INTERNAL MEDICINE

## 2023-05-08 PROCEDURE — 3017F COLORECTAL CA SCREEN DOC REV: CPT | Performed by: INTERNAL MEDICINE

## 2024-03-01 NOTE — PROGRESS NOTES
Subjective:      Patient ID: Leandro Bowen is a 61 y.o. female. HPI  Here for a f/u for LBP,  Has abdominal pain and constipation,. No urinary symptoms   She says is getting better,  Also says that she has acid reflux,   No urinary symptoms   No rash,  Urine was negative,  Has been taking more fiber,     Review of Systems  All the review of systems negative except above   Objective:   Physical Exam  /80 (Site: Left Upper Arm, Position: Sitting, Cuff Size: Medium Adult)   Temp 97.6 °F (36.4 °C)   Resp 13   Wt 172 lb (78 kg)   SpO2 99%   BMI 30.47 kg/m²    The physical exam reveals a patient who appears well, alert and oriented x 3, pleasant, cooperative. Vitals are as noted. Head is atraumatic and normocephalic. Eyes reveal normal conjunctiva, cornea normal, pupils are equal and rective to light. Nasal mucosa is normal. Throat is normal without exudates. Ears reveal normal tympanic membranes. Neck is supple and free of adenopathy, or masses. No thyromegaly. No jugular venous distension. Lungs are clear to auscultation, no rales or rhonchi noted. Heart sounds are regular , no murmurs, clicks, gallops or rubs. Abdomen is soft, no tenderness, masses or organomegaly. Bowel sounds are normally heard. Pelvis: normal. Extremities are normal. Peripheral pulses are normal. Screening neurological exam is normal without focal findings. Cranial nerves are intact, reflexes are symmetrical and muscle strength eaqual. Skin is normal without suspicious lesions noted. Assessment:      Abdominal pain       Plan:      Refer to GI.         Ronnell Sweet MD
[Normal Voice/Communication] : normal voice/communication

## 2024-07-31 ENCOUNTER — OFFICE VISIT (OUTPATIENT)
Age: 64
End: 2024-07-31

## 2024-07-31 VITALS
HEIGHT: 64 IN | TEMPERATURE: 98 F | WEIGHT: 167 LBS | BODY MASS INDEX: 28.51 KG/M2 | SYSTOLIC BLOOD PRESSURE: 162 MMHG | HEART RATE: 104 BPM | OXYGEN SATURATION: 96 % | DIASTOLIC BLOOD PRESSURE: 88 MMHG

## 2024-07-31 DIAGNOSIS — R10.10 PAIN OF UPPER ABDOMEN: Primary | ICD-10-CM

## 2024-07-31 LAB
BILIRUBIN, POC: NEGATIVE
BLOOD URINE, POC: ABNORMAL
CLARITY, POC: CLEAR
COLOR, POC: YELLOW
GLUCOSE URINE, POC: NEGATIVE
KETONES, POC: NEGATIVE
LEUKOCYTE EST, POC: NEGATIVE
NITRITE, POC: NEGATIVE
PH, POC: 6
PROTEIN, POC: ABNORMAL
SPECIFIC GRAVITY, POC: >=1.03
UROBILINOGEN, POC: ABNORMAL

## 2024-07-31 RX ORDER — CYCLOBENZAPRINE HCL 10 MG
10 TABLET ORAL NIGHTLY PRN
Qty: 10 TABLET | Refills: 0 | Status: SHIPPED | OUTPATIENT
Start: 2024-07-31 | End: 2024-08-10

## 2024-07-31 RX ORDER — ROSUVASTATIN CALCIUM 5 MG/1
TABLET, COATED ORAL
COMMUNITY
Start: 2024-05-20

## 2024-07-31 RX ORDER — METOPROLOL SUCCINATE 50 MG/1
50 TABLET, EXTENDED RELEASE ORAL DAILY
COMMUNITY
Start: 2024-07-30

## 2024-07-31 ASSESSMENT — ENCOUNTER SYMPTOMS: ABDOMINAL PAIN: 1

## 2024-07-31 NOTE — PROGRESS NOTES
Pulmonary:      Effort: Pulmonary effort is normal.      Breath sounds: Normal breath sounds.   Abdominal:      General: Abdomen is flat. Bowel sounds are normal.      Palpations: Abdomen is soft.      Tenderness: There is right CVA tenderness. There is no left CVA tenderness.   Musculoskeletal:         General: Normal range of motion.      Cervical back: Normal range of motion and neck supple.   Skin:     General: Skin is warm.   Neurological:      Mental Status: She is alert and oriented to person, place, and time.   Psychiatric:         Mood and Affect: Mood normal.         Behavior: Behavior normal.           An electronic signature was used to authenticate this note.    --Shanda Davis DO

## 2024-08-01 LAB — BACTERIA UR CULT: NORMAL

## 2024-08-18 ENCOUNTER — OFFICE VISIT (OUTPATIENT)
Age: 64
End: 2024-08-18

## 2024-08-18 VITALS
SYSTOLIC BLOOD PRESSURE: 135 MMHG | TEMPERATURE: 98.1 F | WEIGHT: 166 LBS | BODY MASS INDEX: 29.41 KG/M2 | DIASTOLIC BLOOD PRESSURE: 84 MMHG | OXYGEN SATURATION: 99 % | HEIGHT: 63 IN | HEART RATE: 101 BPM

## 2024-08-18 DIAGNOSIS — K80.20 GALL BLADDER STONES: ICD-10-CM

## 2024-08-18 DIAGNOSIS — B02.29 HZV (HERPES ZOSTER VIRUS) POST HERPETIC NEURALGIA: Primary | ICD-10-CM

## 2025-02-26 ENCOUNTER — OFFICE VISIT (OUTPATIENT)
Age: 65
End: 2025-02-26

## 2025-02-26 VITALS
WEIGHT: 162.5 LBS | OXYGEN SATURATION: 99 % | SYSTOLIC BLOOD PRESSURE: 134 MMHG | DIASTOLIC BLOOD PRESSURE: 84 MMHG | TEMPERATURE: 100.7 F | BODY MASS INDEX: 28.79 KG/M2 | HEART RATE: 97 BPM | HEIGHT: 63 IN

## 2025-02-26 DIAGNOSIS — R68.89 FLU-LIKE SYMPTOMS: ICD-10-CM

## 2025-02-26 DIAGNOSIS — J10.1 INFLUENZA A: Primary | ICD-10-CM

## 2025-02-26 LAB
INFLUENZA VIRUS A RNA: POSITIVE
INFLUENZA VIRUS B RNA: NEGATIVE

## 2025-02-26 RX ORDER — OSELTAMIVIR PHOSPHATE 75 MG/1
75 CAPSULE ORAL 2 TIMES DAILY
Qty: 10 CAPSULE | Refills: 0 | Status: SHIPPED | OUTPATIENT
Start: 2025-02-26 | End: 2025-03-03

## 2025-02-26 ASSESSMENT — ENCOUNTER SYMPTOMS: COUGH: 1

## 2025-02-26 NOTE — PATIENT INSTRUCTIONS
REST, WELL HYDRATION FOR 5 DAYS   Isotretinoin Pregnancy And Lactation Text: This medication is Pregnancy Category X and is considered extremely dangerous during pregnancy. It is unknown if it is excreted in breast milk.

## 2025-02-26 NOTE — PROGRESS NOTES
Penelope Mo (:  1960) is a 64 y.o. female,Established patient, here for evaluation of the following chief complaint(s):  Cough (Patient c/o dry cough, runny nose, and body aches, no appetite x4 days/)      ASSESSMENT/PLAN:  1. Flu-like symptoms  - POCT Influenza A/B DNA (Alere i) POSITIVE A    2. Influenza A  - oseltamivir (TAMIFLU) 75 MG capsule; Take 1 capsule by mouth 2 times daily for 5 days  Dispense: 10 capsule; Refill: 0       Return if symptoms worsen or fail to improve.    SUBJECTIVE/OBJECTIVE:  PRESENT TO CLINIC WITH COUGH, RUNNY NOSE AND BODY ACHES FOR FOUR DAYS. HER APPETITE NOT GOOD.      History provided by:  Patient  Cough        Vitals:    25 1413   BP: 134/84   Site: Left Upper Arm   Position: Sitting   Cuff Size: Large Adult   Pulse: 97   Temp: (!) 100.7 °F (38.2 °C)   TempSrc: Oral   SpO2: 99%   Weight: 73.7 kg (162 lb 8 oz)   Height: 1.6 m (5' 3\")       Review of Systems   Respiratory:  Positive for cough.        Physical Exam  Constitutional:       Appearance: Normal appearance.   HENT:      Head: Normocephalic and atraumatic.      Nose: Nose normal.      Mouth/Throat:      Mouth: Mucous membranes are moist.   Eyes:      Pupils: Pupils are equal, round, and reactive to light.   Pulmonary:      Effort: Pulmonary effort is normal.      Breath sounds: Normal breath sounds.   Musculoskeletal:         General: Normal range of motion.      Cervical back: Normal range of motion and neck supple.   Skin:     General: Skin is warm.   Neurological:      Mental Status: She is alert and oriented to person, place, and time.   Psychiatric:         Mood and Affect: Mood normal.         Behavior: Behavior normal.           An electronic signature was used to authenticate this note.    --Shanda Davis DO